# Patient Record
Sex: FEMALE | Race: WHITE | Employment: OTHER | URBAN - METROPOLITAN AREA
[De-identification: names, ages, dates, MRNs, and addresses within clinical notes are randomized per-mention and may not be internally consistent; named-entity substitution may affect disease eponyms.]

---

## 2017-02-13 ENCOUNTER — OFFICE VISIT (OUTPATIENT)
Dept: PHYSICAL THERAPY | Age: 37
End: 2017-02-13
Attending: SURGERY
Payer: COMMERCIAL

## 2017-02-13 DIAGNOSIS — C43.71 MALIGNANT MELANOMA OF RIGHT KNEE (HCC): Primary | ICD-10-CM

## 2017-02-13 PROCEDURE — 97535 SELF CARE MNGMENT TRAINING: CPT

## 2017-02-13 PROCEDURE — 97161 PT EVAL LOW COMPLEX 20 MIN: CPT

## 2017-02-13 NOTE — PROGRESS NOTES
PHYSICAL THERAPY LE LYMPHEDEMA EVALUATION:   Referring Physician: Dr. Elisa Palomo  Diagnosis: Malignant melanoma of right knee [facility]) (C43.71)  Date of Onset: 11/30/16 Date of Service: 2/13/2017     PATIENT SUMMARY   Nancy Murrell is a 29year old y/o female wh is not needed as pt only has 1.4 % limb difference with no evident assymmetry and most of of girth  Measurement with no higher than 1 cm difference.  Pt also has symmetrical and WFL strength and ROM on BLE major joints and mm at 0 extension and 145 flexion Timed Treatment: 20 min     Total Treatment Time: 75 min         PLAN OF CARE:    Goals:  After 1-3 sessions    1. Pt will demonstrate good comprehension /return demonstration of self manual drainage techniques to manage well at home     2.  Pt will be indep

## 2017-02-21 ENCOUNTER — APPOINTMENT (OUTPATIENT)
Dept: PHYSICAL THERAPY | Age: 37
End: 2017-02-21
Attending: SURGERY
Payer: COMMERCIAL

## 2017-02-22 ENCOUNTER — APPOINTMENT (OUTPATIENT)
Dept: PHYSICAL THERAPY | Age: 37
End: 2017-02-22
Attending: SURGERY
Payer: COMMERCIAL

## 2017-02-23 ENCOUNTER — APPOINTMENT (OUTPATIENT)
Dept: PHYSICAL THERAPY | Age: 37
End: 2017-02-23
Attending: SURGERY
Payer: COMMERCIAL

## 2017-02-24 ENCOUNTER — APPOINTMENT (OUTPATIENT)
Dept: PHYSICAL THERAPY | Age: 37
End: 2017-02-24
Attending: SURGERY
Payer: COMMERCIAL

## 2017-02-27 ENCOUNTER — APPOINTMENT (OUTPATIENT)
Dept: PHYSICAL THERAPY | Age: 37
End: 2017-02-27
Attending: SURGERY
Payer: COMMERCIAL

## 2017-02-28 ENCOUNTER — APPOINTMENT (OUTPATIENT)
Dept: PHYSICAL THERAPY | Age: 37
End: 2017-02-28
Attending: SURGERY
Payer: COMMERCIAL

## 2017-03-01 ENCOUNTER — APPOINTMENT (OUTPATIENT)
Dept: PHYSICAL THERAPY | Age: 37
End: 2017-03-01
Attending: SURGERY
Payer: COMMERCIAL

## 2017-03-02 ENCOUNTER — APPOINTMENT (OUTPATIENT)
Dept: PHYSICAL THERAPY | Age: 37
End: 2017-03-02
Attending: SURGERY
Payer: COMMERCIAL

## 2017-03-03 ENCOUNTER — OFFICE VISIT (OUTPATIENT)
Dept: SURGERY | Facility: CLINIC | Age: 37
End: 2017-03-03

## 2017-03-03 VITALS
BODY MASS INDEX: 24.42 KG/M2 | HEART RATE: 118 BPM | SYSTOLIC BLOOD PRESSURE: 126 MMHG | DIASTOLIC BLOOD PRESSURE: 81 MMHG | HEIGHT: 63 IN | WEIGHT: 137.81 LBS

## 2017-03-03 DIAGNOSIS — Z85.820 HISTORY OF MELANOMA: Primary | ICD-10-CM

## 2017-03-03 PROCEDURE — 99213 OFFICE O/P EST LOW 20 MIN: CPT | Performed by: SURGERY

## 2017-03-03 NOTE — PROGRESS NOTES
Estabilshed Patient Consultation    Chief Complaint: h/o melanoma R thigh s/p resection and flap reconstruction    History of Present Illness:   Lexus Verdin is a 29year old female who returns to the office after undergoing excision of melanoma, marilu HPI  All other systems are unremarkable. Physical Exam:    /81 mmHg  Pulse 118  Ht 1.6 m (5' 3\")  Wt 62.506 kg (137 lb 12.8 oz)  BMI 24.42 kg/m2    Constitutional: The patient is an alert, oriented and well-developed.      Neurologic: Speech pat

## 2017-03-06 ENCOUNTER — APPOINTMENT (OUTPATIENT)
Dept: PHYSICAL THERAPY | Age: 37
End: 2017-03-06
Attending: SURGERY
Payer: COMMERCIAL

## 2017-03-07 ENCOUNTER — APPOINTMENT (OUTPATIENT)
Dept: PHYSICAL THERAPY | Age: 37
End: 2017-03-07
Attending: SURGERY
Payer: COMMERCIAL

## 2017-03-08 ENCOUNTER — APPOINTMENT (OUTPATIENT)
Dept: PHYSICAL THERAPY | Age: 37
End: 2017-03-08
Attending: SURGERY
Payer: COMMERCIAL

## 2017-03-09 ENCOUNTER — APPOINTMENT (OUTPATIENT)
Dept: PHYSICAL THERAPY | Age: 37
End: 2017-03-09
Attending: SURGERY
Payer: COMMERCIAL

## 2017-03-13 ENCOUNTER — APPOINTMENT (OUTPATIENT)
Dept: PHYSICAL THERAPY | Age: 37
End: 2017-03-13
Attending: SURGERY
Payer: COMMERCIAL

## 2017-03-14 ENCOUNTER — APPOINTMENT (OUTPATIENT)
Dept: PHYSICAL THERAPY | Age: 37
End: 2017-03-14
Attending: SURGERY
Payer: COMMERCIAL

## 2017-03-15 ENCOUNTER — APPOINTMENT (OUTPATIENT)
Dept: PHYSICAL THERAPY | Age: 37
End: 2017-03-15
Attending: SURGERY
Payer: COMMERCIAL

## 2017-03-16 ENCOUNTER — APPOINTMENT (OUTPATIENT)
Dept: PHYSICAL THERAPY | Age: 37
End: 2017-03-16
Attending: SURGERY
Payer: COMMERCIAL

## 2017-03-23 PROCEDURE — 88305 TISSUE EXAM BY PATHOLOGIST: CPT | Performed by: SURGERY

## 2017-03-23 PROCEDURE — 88342 IMHCHEM/IMCYTCHM 1ST ANTB: CPT | Performed by: SURGERY

## 2017-03-23 PROCEDURE — 88341 IMHCHEM/IMCYTCHM EA ADD ANTB: CPT | Performed by: SURGERY

## 2017-03-23 PROCEDURE — 88173 CYTOPATH EVAL FNA REPORT: CPT | Performed by: SURGERY

## 2017-04-11 PROBLEM — C78.7 LIVER METASTASIS: Status: ACTIVE | Noted: 2017-04-11

## 2017-04-11 PROBLEM — C43.9 METASTATIC MELANOMA (HCC): Status: ACTIVE | Noted: 2017-04-11

## 2017-04-11 PROBLEM — C78.7 LIVER METASTASIS (HCC): Status: ACTIVE | Noted: 2017-04-11

## 2017-04-11 PROBLEM — C79.51 BONE METASTASIS (HCC): Status: ACTIVE | Noted: 2017-04-11

## 2017-04-11 PROBLEM — C79.51 BONE METASTASIS: Status: ACTIVE | Noted: 2017-04-11

## 2017-04-11 PROBLEM — C79.9 METASTATIC MELANOMA (HCC): Status: ACTIVE | Noted: 2017-04-11

## 2017-04-11 PROCEDURE — 86301 IMMUNOASSAY TUMOR CA 19-9: CPT | Performed by: INTERNAL MEDICINE

## 2017-04-11 PROCEDURE — 82378 CARCINOEMBRYONIC ANTIGEN: CPT | Performed by: INTERNAL MEDICINE

## 2017-04-11 PROCEDURE — 86304 IMMUNOASSAY TUMOR CA 125: CPT | Performed by: INTERNAL MEDICINE

## 2017-04-13 ENCOUNTER — LAB ENCOUNTER (OUTPATIENT)
Dept: LAB | Facility: HOSPITAL | Age: 37
End: 2017-04-13
Attending: INTERNAL MEDICINE
Payer: COMMERCIAL

## 2017-04-13 DIAGNOSIS — C79.9 METASTATIC MELANOMA (HCC): Primary | ICD-10-CM

## 2017-04-13 PROCEDURE — 81210 BRAF GENE: CPT

## 2017-04-13 PROCEDURE — 88381 MICRODISSECTION MANUAL: CPT

## 2017-04-20 PROBLEM — Z51.81 THERAPEUTIC DRUG MONITORING: Status: ACTIVE | Noted: 2017-04-20

## 2017-04-27 PROBLEM — M54.6 THORACIC BACK PAIN: Status: ACTIVE | Noted: 2017-04-27

## 2017-04-27 PROBLEM — R10.31 RIGHT INGUINAL PAIN: Status: ACTIVE | Noted: 2017-04-27

## 2017-05-11 PROBLEM — R53.83 FATIGUE: Status: ACTIVE | Noted: 2017-05-11

## 2017-06-09 ENCOUNTER — APPOINTMENT (OUTPATIENT)
Dept: CT IMAGING | Facility: HOSPITAL | Age: 37
End: 2017-06-09
Attending: EMERGENCY MEDICINE
Payer: COMMERCIAL

## 2017-06-09 ENCOUNTER — HOSPITAL ENCOUNTER (EMERGENCY)
Facility: HOSPITAL | Age: 37
Discharge: HOME | End: 2017-06-09
Attending: EMERGENCY MEDICINE
Payer: COMMERCIAL

## 2017-06-09 ENCOUNTER — APPOINTMENT (OUTPATIENT)
Dept: GENERAL RADIOLOGY | Facility: HOSPITAL | Age: 37
End: 2017-06-09
Attending: EMERGENCY MEDICINE
Payer: COMMERCIAL

## 2017-06-09 VITALS
WEIGHT: 140 LBS | BODY MASS INDEX: 24.8 KG/M2 | HEART RATE: 102 BPM | HEIGHT: 63 IN | DIASTOLIC BLOOD PRESSURE: 72 MMHG | RESPIRATION RATE: 16 BRPM | SYSTOLIC BLOOD PRESSURE: 126 MMHG | OXYGEN SATURATION: 100 % | TEMPERATURE: 99 F

## 2017-06-09 DIAGNOSIS — C78.00 METASTATIC MELANOMA TO LUNG, UNSPECIFIED LATERALITY (HCC): Primary | ICD-10-CM

## 2017-06-09 PROCEDURE — 84484 ASSAY OF TROPONIN QUANT: CPT | Performed by: EMERGENCY MEDICINE

## 2017-06-09 PROCEDURE — 85025 COMPLETE CBC W/AUTO DIFF WBC: CPT | Performed by: EMERGENCY MEDICINE

## 2017-06-09 PROCEDURE — 36415 COLL VENOUS BLD VENIPUNCTURE: CPT

## 2017-06-09 PROCEDURE — 71020 XR CHEST PA + LAT CHEST (CPT=71020): CPT | Performed by: EMERGENCY MEDICINE

## 2017-06-09 PROCEDURE — 99285 EMERGENCY DEPT VISIT HI MDM: CPT

## 2017-06-09 PROCEDURE — 80048 BASIC METABOLIC PNL TOTAL CA: CPT | Performed by: EMERGENCY MEDICINE

## 2017-06-09 PROCEDURE — 93010 ELECTROCARDIOGRAM REPORT: CPT | Performed by: EMERGENCY MEDICINE

## 2017-06-09 PROCEDURE — 93005 ELECTROCARDIOGRAM TRACING: CPT

## 2017-06-09 PROCEDURE — 71260 CT THORAX DX C+: CPT | Performed by: EMERGENCY MEDICINE

## 2017-06-09 RX ORDER — HYDROCODONE BITARTRATE AND ACETAMINOPHEN 5; 325 MG/1; MG/1
1-2 TABLET ORAL EVERY 4 HOURS PRN
Qty: 20 TABLET | Refills: 0 | Status: SHIPPED | OUTPATIENT
Start: 2017-06-09 | End: 2017-06-16

## 2017-06-09 NOTE — ED NOTES
Pt here for chest pain. CT ordered to RO PE or disease process. Pt aware and discussed with Dr Pedro Garcia and agreeable to plan.

## 2017-06-09 NOTE — ED PROVIDER NOTES
Patient Seen in: [facility] AND [facility] Emergency Department    History   No chief complaint on file.     Stated Complaint: chest pain    HPI    40year old female with a history of metastatic melanoma to bone and liver complaining of 2 days of diffuse chest sascha socially      Review of Systems    Positive for stated complaint: chest pain  Other systems are as noted in HPI. Constitutional and vital signs reviewed. All other systems reviewed and negative except as noted above.     PSFH elements reviewed from to affect. Her behavior is normal.   Nursing note and vitals reviewed.               ED Course   Nursing notes and Triage vitals reviewed  Labs Reviewed   BASIC METABOLIC PANEL (8) - Abnormal; Notable for the following:     BUN 7 (*)     All other components w or adenopathy. PULMONARY ARTERIES: Normal. No evidence for pulmonary embolus. AORTA: Normal.  No aneurysm or dissection. LUNGS/PLEURA: Too numerous to count bilateral perivascular pulmonary     nodules.  2 largest measurable nodules in each zarina fibroadenomas. 4. Incompletely visualized left adrenal nodules suspicious for metastases. 5. No evidence for pulmonary embolus. 6. Subcentimeter bilateral thyroid nodules.                          XR CHEST PA + LAT CHEST (SBQ=06895)   Final Result history:   able to obtain history from patient  Factors limiting our ability to obtain a history: None    Medical Record Review: I personally reviewed available prior medical records for any recent pertinent discharge summaries, testing, and procedures and Schedule an appointment as soon as possible for a visit        Medications Prescribed:  Current Discharge Medication List    START taking these medications    HYDROcodone-acetaminophen 5-325 MG Oral Tab  Take 1-2 tablets by mouth every 4 (four) hours a

## 2017-07-05 PROBLEM — R50.2 DRUG-INDUCED FEVER: Status: ACTIVE | Noted: 2017-07-05

## 2017-07-11 PROBLEM — D50.9 MICROCYTIC ANEMIA: Status: ACTIVE | Noted: 2017-07-11

## 2017-08-24 PROBLEM — R71.8 MICROCYTOSIS: Status: ACTIVE | Noted: 2017-01-01

## 2017-10-09 PROBLEM — R71.8 MICROCYTOSIS: Status: ACTIVE | Noted: 2017-01-01

## 2017-10-09 PROBLEM — R71.8 MICROCYTOSIS: Status: RESOLVED | Noted: 2017-01-01 | Resolved: 2017-01-01

## 2018-01-01 ENCOUNTER — TELEPHONE (OUTPATIENT)
Dept: RADIATION ONCOLOGY | Facility: HOSPITAL | Age: 38
End: 2018-01-01

## 2018-01-01 ENCOUNTER — HOSPITAL ENCOUNTER (INPATIENT)
Facility: HOSPITAL | Age: 38
LOS: 20 days | Discharge: HOME HEALTH | DRG: 596 | End: 2018-01-01
Attending: EMERGENCY MEDICINE | Admitting: HOSPITALIST
Payer: COMMERCIAL

## 2018-01-01 ENCOUNTER — LAB ENCOUNTER (OUTPATIENT)
Dept: LAB | Facility: HOSPITAL | Age: 38
End: 2018-01-01
Attending: PHYSICIAN ASSISTANT
Payer: COMMERCIAL

## 2018-01-01 ENCOUNTER — OFFICE VISIT (OUTPATIENT)
Dept: HEMATOLOGY/ONCOLOGY | Facility: HOSPITAL | Age: 38
End: 2018-01-01
Attending: INTERNAL MEDICINE
Payer: COMMERCIAL

## 2018-01-01 ENCOUNTER — APPOINTMENT (OUTPATIENT)
Dept: GENERAL RADIOLOGY | Facility: HOSPITAL | Age: 38
DRG: 596 | End: 2018-01-01
Attending: INTERNAL MEDICINE
Payer: COMMERCIAL

## 2018-01-01 ENCOUNTER — APPOINTMENT (OUTPATIENT)
Dept: ULTRASOUND IMAGING | Facility: HOSPITAL | Age: 38
DRG: 596 | End: 2018-01-01
Attending: HOSPITALIST
Payer: COMMERCIAL

## 2018-01-01 ENCOUNTER — OFFICE VISIT (OUTPATIENT)
Dept: RADIATION ONCOLOGY | Facility: HOSPITAL | Age: 38
DRG: 596 | End: 2018-01-01
Attending: RADIOLOGY
Payer: COMMERCIAL

## 2018-01-01 ENCOUNTER — HOSPITAL ENCOUNTER (INPATIENT)
Facility: HOSPITAL | Age: 38
LOS: 4 days | Discharge: HOME HEALTH | DRG: 596 | End: 2018-01-01
Attending: EMERGENCY MEDICINE | Admitting: HOSPITALIST
Payer: COMMERCIAL

## 2018-01-01 ENCOUNTER — APPOINTMENT (OUTPATIENT)
Dept: INTERVENTIONAL RADIOLOGY/VASCULAR | Facility: HOSPITAL | Age: 38
DRG: 596 | End: 2018-01-01
Attending: CLINICAL NURSE SPECIALIST
Payer: COMMERCIAL

## 2018-01-01 ENCOUNTER — APPOINTMENT (OUTPATIENT)
Dept: CT IMAGING | Facility: HOSPITAL | Age: 38
DRG: 596 | End: 2018-01-01
Attending: EMERGENCY MEDICINE
Payer: COMMERCIAL

## 2018-01-01 ENCOUNTER — APPOINTMENT (OUTPATIENT)
Dept: GENERAL RADIOLOGY | Facility: HOSPITAL | Age: 38
DRG: 596 | End: 2018-01-01
Attending: EMERGENCY MEDICINE
Payer: COMMERCIAL

## 2018-01-01 ENCOUNTER — HOSPITAL ENCOUNTER (EMERGENCY)
Facility: HOSPITAL | Age: 38
Discharge: HOME | End: 2018-01-01
Attending: EMERGENCY MEDICINE
Payer: COMMERCIAL

## 2018-01-01 ENCOUNTER — TELEPHONE (OUTPATIENT)
Dept: HEMATOLOGY/ONCOLOGY | Facility: HOSPITAL | Age: 38
End: 2018-01-01

## 2018-01-01 ENCOUNTER — APPOINTMENT (OUTPATIENT)
Dept: RADIATION ONCOLOGY | Facility: HOSPITAL | Age: 38
End: 2018-01-01
Attending: RADIOLOGY
Payer: COMMERCIAL

## 2018-01-01 ENCOUNTER — NURSE ONLY (OUTPATIENT)
Dept: INTEGRATIVE MEDICINE | Facility: HOSPITAL | Age: 38
End: 2018-01-01
Attending: GENERAL ACUTE CARE HOSPITAL

## 2018-01-01 ENCOUNTER — APPOINTMENT (OUTPATIENT)
Dept: ULTRASOUND IMAGING | Facility: HOSPITAL | Age: 38
DRG: 595 | End: 2018-01-01
Attending: EMERGENCY MEDICINE
Payer: COMMERCIAL

## 2018-01-01 ENCOUNTER — APPOINTMENT (OUTPATIENT)
Dept: CV DIAGNOSTICS | Facility: HOSPITAL | Age: 38
DRG: 596 | End: 2018-01-01
Attending: HOSPITALIST
Payer: COMMERCIAL

## 2018-01-01 ENCOUNTER — HOSPITAL ENCOUNTER (OUTPATIENT)
Dept: ULTRASOUND IMAGING | Facility: HOSPITAL | Age: 38
Discharge: HOME | End: 2018-01-01
Attending: INTERNAL MEDICINE
Payer: COMMERCIAL

## 2018-01-01 ENCOUNTER — HOSPITAL ENCOUNTER (INPATIENT)
Facility: HOSPITAL | Age: 38
LOS: 2 days | DRG: 595 | End: 2018-01-01
Attending: EMERGENCY MEDICINE | Admitting: INTERNAL MEDICINE
Payer: COMMERCIAL

## 2018-01-01 ENCOUNTER — HOSPITAL ENCOUNTER (OUTPATIENT)
Dept: ULTRASOUND IMAGING | Facility: HOSPITAL | Age: 38
Discharge: HOME | End: 2018-01-01
Attending: CLINICAL NURSE SPECIALIST
Payer: COMMERCIAL

## 2018-01-01 ENCOUNTER — APPOINTMENT (OUTPATIENT)
Dept: GENERAL RADIOLOGY | Facility: HOSPITAL | Age: 38
DRG: 595 | End: 2018-01-01
Attending: EMERGENCY MEDICINE
Payer: COMMERCIAL

## 2018-01-01 ENCOUNTER — APPOINTMENT (OUTPATIENT)
Dept: ULTRASOUND IMAGING | Facility: HOSPITAL | Age: 38
DRG: 596 | End: 2018-01-01
Attending: INTERNAL MEDICINE
Payer: COMMERCIAL

## 2018-01-01 ENCOUNTER — APPOINTMENT (OUTPATIENT)
Dept: GENERAL RADIOLOGY | Facility: HOSPITAL | Age: 38
DRG: 596 | End: 2018-01-01
Attending: HOSPITALIST
Payer: COMMERCIAL

## 2018-01-01 ENCOUNTER — APPOINTMENT (OUTPATIENT)
Dept: GENERAL RADIOLOGY | Facility: HOSPITAL | Age: 38
End: 2018-01-01
Attending: EMERGENCY MEDICINE
Payer: COMMERCIAL

## 2018-01-01 VITALS
HEART RATE: 116 BPM | WEIGHT: 158.06 LBS | BODY MASS INDEX: 28 KG/M2 | SYSTOLIC BLOOD PRESSURE: 100 MMHG | OXYGEN SATURATION: 93 % | HEIGHT: 63 IN | RESPIRATION RATE: 18 BRPM | DIASTOLIC BLOOD PRESSURE: 64 MMHG | TEMPERATURE: 98 F

## 2018-01-01 VITALS
RESPIRATION RATE: 18 BRPM | WEIGHT: 150 LBS | BODY MASS INDEX: 26.58 KG/M2 | TEMPERATURE: 98 F | HEIGHT: 63 IN | DIASTOLIC BLOOD PRESSURE: 67 MMHG | SYSTOLIC BLOOD PRESSURE: 97 MMHG | OXYGEN SATURATION: 98 % | HEART RATE: 125 BPM

## 2018-01-01 VITALS
TEMPERATURE: 102 F | HEIGHT: 63 IN | RESPIRATION RATE: 20 BRPM | HEART RATE: 143 BPM | OXYGEN SATURATION: 99 % | SYSTOLIC BLOOD PRESSURE: 73 MMHG | BODY MASS INDEX: 24.52 KG/M2 | WEIGHT: 138.38 LBS | DIASTOLIC BLOOD PRESSURE: 40 MMHG

## 2018-01-01 VITALS
RESPIRATION RATE: 16 BRPM | DIASTOLIC BLOOD PRESSURE: 73 MMHG | OXYGEN SATURATION: 97 % | SYSTOLIC BLOOD PRESSURE: 111 MMHG | HEART RATE: 126 BPM

## 2018-01-01 VITALS
DIASTOLIC BLOOD PRESSURE: 63 MMHG | RESPIRATION RATE: 16 BRPM | SYSTOLIC BLOOD PRESSURE: 102 MMHG | TEMPERATURE: 98 F | HEART RATE: 104 BPM

## 2018-01-01 VITALS
OXYGEN SATURATION: 95 % | DIASTOLIC BLOOD PRESSURE: 78 MMHG | TEMPERATURE: 98 F | RESPIRATION RATE: 20 BRPM | HEART RATE: 112 BPM | BODY MASS INDEX: 24 KG/M2 | SYSTOLIC BLOOD PRESSURE: 102 MMHG | WEIGHT: 135.19 LBS

## 2018-01-01 VITALS
SYSTOLIC BLOOD PRESSURE: 92 MMHG | HEART RATE: 100 BPM | TEMPERATURE: 98 F | RESPIRATION RATE: 16 BRPM | DIASTOLIC BLOOD PRESSURE: 55 MMHG

## 2018-01-01 VITALS
DIASTOLIC BLOOD PRESSURE: 78 MMHG | RESPIRATION RATE: 16 BRPM | TEMPERATURE: 98 F | HEART RATE: 105 BPM | SYSTOLIC BLOOD PRESSURE: 119 MMHG

## 2018-01-01 DIAGNOSIS — C79.9 METASTATIC MELANOMA (HCC): ICD-10-CM

## 2018-01-01 DIAGNOSIS — C79.31 BRAIN METASTASES (HCC): Primary | ICD-10-CM

## 2018-01-01 DIAGNOSIS — E87.1 HYPONATREMIA: ICD-10-CM

## 2018-01-01 DIAGNOSIS — C79.9 METASTATIC MELANOMA (HCC): Primary | ICD-10-CM

## 2018-01-01 DIAGNOSIS — E72.20 HYPERAMMONEMIA (HCC): Primary | ICD-10-CM

## 2018-01-01 DIAGNOSIS — R18.0 MALIGNANT ASCITES: ICD-10-CM

## 2018-01-01 DIAGNOSIS — E87.5 HYPERKALEMIA: ICD-10-CM

## 2018-01-01 DIAGNOSIS — D50.9 MICROCYTIC ANEMIA: ICD-10-CM

## 2018-01-01 DIAGNOSIS — R50.2 DRUG-INDUCED FEVER: Primary | ICD-10-CM

## 2018-01-01 DIAGNOSIS — C78.7 LIVER METASTASIS (HCC): ICD-10-CM

## 2018-01-01 DIAGNOSIS — R00.0 TACHYCARDIA: ICD-10-CM

## 2018-01-01 DIAGNOSIS — R06.00 DYSPNEA, UNSPECIFIED TYPE: ICD-10-CM

## 2018-01-01 DIAGNOSIS — R74.01 TRANSAMINITIS: ICD-10-CM

## 2018-01-01 DIAGNOSIS — C43.71 MALIGNANT MELANOMA OF RIGHT KNEE (HCC): ICD-10-CM

## 2018-01-01 DIAGNOSIS — E72.20 HYPERAMMONEMIA (HCC): ICD-10-CM

## 2018-01-01 DIAGNOSIS — R18.8 OTHER ASCITES: ICD-10-CM

## 2018-01-01 DIAGNOSIS — R18.0 ASCITES, MALIGNANT: ICD-10-CM

## 2018-01-01 DIAGNOSIS — D64.89 ANEMIA DUE TO OTHER CAUSE, NOT CLASSIFIED: ICD-10-CM

## 2018-01-01 DIAGNOSIS — C79.9 METASTATIC CANCER (HCC): ICD-10-CM

## 2018-01-01 DIAGNOSIS — C79.51 BONE METASTASIS (HCC): ICD-10-CM

## 2018-01-01 DIAGNOSIS — R53.1 WEAKNESS: ICD-10-CM

## 2018-01-01 DIAGNOSIS — M79.89 SWELLING OF BOTH LOWER EXTREMITIES: ICD-10-CM

## 2018-01-01 DIAGNOSIS — D50.9 IRON DEFICIENCY ANEMIA, UNSPECIFIED: Primary | ICD-10-CM

## 2018-01-01 LAB
ALBUMIN SERPL BCP-MCNC: 1.2 G/DL (ref 3.5–4.8)
ALBUMIN SERPL BCP-MCNC: 1.4 G/DL (ref 3.5–4.8)
ALBUMIN SERPL BCP-MCNC: 2.5 G/DL (ref 3.5–4.8)
ALBUMIN/GLOB SERPL: 0.5 {RATIO} (ref 1–2)
ALP SERPL-CCNC: 145 U/L (ref 32–100)
ALP SERPL-CCNC: 1985 U/L (ref 32–100)
ALP SERPL-CCNC: 2655 U/L (ref 32–100)
ALT SERPL-CCNC: 103 U/L (ref 14–54)
ALT SERPL-CCNC: 24 U/L (ref 14–54)
ALT SERPL-CCNC: 96 U/L (ref 14–54)
AMMONIA PLAS-MCNC: 171 UG/DL (ref 19.5–64.6)
AMMONIA PLAS-MCNC: 215 UG/DL (ref 19.5–64.6)
ANION GAP SERPL CALC-SCNC: 10 MMOL/L (ref 0–18)
ANION GAP SERPL CALC-SCNC: 11 MMOL/L (ref 0–18)
ANION GAP SERPL CALC-SCNC: 7 MMOL/L (ref 0–18)
ANTIBODY SCREEN: NEGATIVE
ANTIBODY SCREEN: NEGATIVE
AST SERPL-CCNC: 108 U/L (ref 15–41)
AST SERPL-CCNC: 114 U/L (ref 15–41)
AST SERPL-CCNC: 29 U/L (ref 15–41)
BASOPHILS # BLD: 0 K/UL (ref 0–0.2)
BASOPHILS # BLD: 0.1 K/UL (ref 0–0.2)
BASOPHILS # BLD: 0.1 K/UL (ref 0–0.2)
BASOPHILS NFR BLD: 0 %
BASOPHILS NFR BLD: 1 %
BASOPHILS NFR BLD: 1 %
BILIRUB DIRECT SERPL-MCNC: 0.1 MG/DL (ref 0–0.2)
BILIRUB DIRECT SERPL-MCNC: 0.8 MG/DL (ref 0–0.2)
BILIRUB SERPL-MCNC: 0.6 MG/DL (ref 0.3–1.2)
BILIRUB SERPL-MCNC: 1.5 MG/DL (ref 0.3–1.2)
BILIRUB SERPL-MCNC: 1.6 MG/DL (ref 0.3–1.2)
BILIRUB UR QL: NEGATIVE
BILIRUB UR QL: NEGATIVE
BLOOD TYPE BARCODE: 5100
BUN SERPL-MCNC: 11 MG/DL (ref 8–20)
BUN SERPL-MCNC: 11 MG/DL (ref 8–20)
BUN SERPL-MCNC: 12 MG/DL (ref 8–20)
BUN/CREAT SERPL: 15.5 (ref 10–20)
BUN/CREAT SERPL: 19.3 (ref 10–20)
BUN/CREAT SERPL: 30.8 (ref 10–20)
CALCIUM SERPL-MCNC: 5.9 MG/DL (ref 8.5–10.5)
CALCIUM SERPL-MCNC: 6.2 MG/DL (ref 8.5–10.5)
CALCIUM SERPL-MCNC: 8.5 MG/DL (ref 8.5–10.5)
CHLORIDE SERPL-SCNC: 103 MMOL/L (ref 95–110)
CHLORIDE SERPL-SCNC: 99 MMOL/L (ref 95–110)
CHLORIDE SERPL-SCNC: 99 MMOL/L (ref 95–110)
CLARITY UR: CLEAR
CO2 SERPL-SCNC: 24 MMOL/L (ref 22–32)
CO2 SERPL-SCNC: 25 MMOL/L (ref 22–32)
CO2 SERPL-SCNC: 25 MMOL/L (ref 22–32)
COLOR UR: YELLOW
CORTIS SERPL-MCNC: 18.3 MCG/DL
CREAT SERPL-MCNC: 0.39 MG/DL (ref 0.5–1.5)
CREAT SERPL-MCNC: 0.57 MG/DL (ref 0.5–1.5)
CREAT SERPL-MCNC: 0.71 MG/DL (ref 0.5–1.5)
EOSINOPHIL # BLD: 0 K/UL (ref 0–0.7)
EOSINOPHIL # BLD: 0.1 K/UL (ref 0–0.7)
EOSINOPHIL # BLD: 0.1 K/UL (ref 0–0.7)
EOSINOPHIL NFR BLD: 0 %
EOSINOPHIL NFR BLD: 1 %
EOSINOPHIL NFR BLD: 1 %
ERYTHROCYTE [DISTWIDTH] IN BLOOD BY AUTOMATED COUNT: 19.5 % (ref 11–15)
ERYTHROCYTE [DISTWIDTH] IN BLOOD BY AUTOMATED COUNT: 23.9 % (ref 11–15)
ERYTHROCYTE [DISTWIDTH] IN BLOOD BY AUTOMATED COUNT: 24.6 % (ref 11–15)
FERRITIN SERPL IA-MCNC: 1985 NG/ML (ref 11–307)
GLOBULIN PLAS-MCNC: 2.6 G/DL (ref 2.5–3.7)
GLUCOSE BLDC GLUCOMTR-MCNC: 109 MG/DL (ref 70–99)
GLUCOSE BLDC GLUCOMTR-MCNC: 116 MG/DL (ref 70–99)
GLUCOSE BLDC GLUCOMTR-MCNC: 131 MG/DL (ref 70–99)
GLUCOSE BLDC GLUCOMTR-MCNC: 140 MG/DL (ref 70–99)
GLUCOSE BLDC GLUCOMTR-MCNC: 195 MG/DL (ref 70–99)
GLUCOSE SERPL-MCNC: 105 MG/DL (ref 70–99)
GLUCOSE SERPL-MCNC: 149 MG/DL (ref 70–99)
GLUCOSE SERPL-MCNC: 166 MG/DL (ref 70–99)
GLUCOSE UR-MCNC: NEGATIVE MG/DL
GLUCOSE UR-MCNC: NEGATIVE MG/DL
HBA1C MFR BLD: 7.7 % (ref 4–6)
HCT VFR BLD AUTO: 25.4 % (ref 35–48)
HCT VFR BLD AUTO: 35.5 % (ref 35–48)
HCT VFR BLD AUTO: 39.2 % (ref 35–48)
HGB BLD-MCNC: 10.9 G/DL (ref 12–16)
HGB BLD-MCNC: 11.6 G/DL (ref 12–16)
HGB BLD-MCNC: 8 G/DL (ref 12–16)
HGB UR QL STRIP.AUTO: NEGATIVE
HYALINE CASTS #/AREA URNS AUTO: 1 /LPF
INR BLD: 1.5 (ref 0.9–1.2)
IRON SATN MFR SERPL: 10 % (ref 15–50)
IRON SERPL-MCNC: 15 MCG/DL (ref 28–170)
KETONES UR-MCNC: NEGATIVE MG/DL
LACTATE SERPL-SCNC: 1.9 MMOL/L (ref 0.5–2.2)
LACTATE SERPL-SCNC: 3.1 MMOL/L (ref 0.5–2.2)
LACTATE SERPL-SCNC: 3.3 MMOL/L (ref 0.5–2.2)
LACTATE SERPL-SCNC: 3.4 MMOL/L (ref 0.5–2.2)
LACTATE SERPL-SCNC: 3.5 MMOL/L (ref 0.5–2.2)
LYMPHOCYTES # BLD: 0.5 K/UL (ref 1–4)
LYMPHOCYTES # BLD: 1.5 K/UL (ref 1–4)
LYMPHOCYTES # BLD: 2 K/UL (ref 1–4)
LYMPHOCYTES NFR BLD: 11 %
LYMPHOCYTES NFR BLD: 15 %
LYMPHOCYTES NFR BLD: 3 %
MCH RBC QN AUTO: 23.4 PG (ref 27–32)
MCH RBC QN AUTO: 24 PG (ref 27–32)
MCH RBC QN AUTO: 24.1 PG (ref 27–32)
MCHC RBC AUTO-ENTMCNC: 29.6 G/DL (ref 32–37)
MCHC RBC AUTO-ENTMCNC: 30.8 G/DL (ref 32–37)
MCHC RBC AUTO-ENTMCNC: 31.3 G/DL (ref 32–37)
MCV RBC AUTO: 76.5 FL (ref 80–100)
MCV RBC AUTO: 78.2 FL (ref 80–100)
MCV RBC AUTO: 79.1 FL (ref 80–100)
MONOCYTES # BLD: 0.4 K/UL (ref 0–1)
MONOCYTES # BLD: 0.4 K/UL (ref 0–1)
MONOCYTES # BLD: 0.7 K/UL (ref 0–1)
MONOCYTES NFR BLD: 3 %
MONOCYTES NFR BLD: 3 %
MONOCYTES NFR BLD: 5 %
NEUTROPHILS # BLD AUTO: 11.1 K/UL (ref 1.8–7.7)
NEUTROPHILS # BLD AUTO: 12 K/UL (ref 1.8–7.7)
NEUTROPHILS # BLD AUTO: 14 K/UL (ref 1.8–7.7)
NEUTROPHILS NFR BLD: 81 %
NEUTROPHILS NFR BLD: 86 %
NEUTROPHILS NFR BLD: 91 %
NITRITE UR QL STRIP.AUTO: NEGATIVE
NITRITE UR QL STRIP.AUTO: NEGATIVE
OSMOLALITY UR CALC.SUM OF ELEC: 278 MOSM/KG (ref 275–295)
OSMOLALITY UR CALC.SUM OF ELEC: 280 MOSM/KG (ref 275–295)
OSMOLALITY UR CALC.SUM OF ELEC: 283 MOSM/KG (ref 275–295)
PH UR: 5 [PH] (ref 5–8)
PH UR: 5 [PH] (ref 5–8)
PLATELET # BLD AUTO: 149 K/UL (ref 140–400)
PLATELET # BLD AUTO: 172 K/UL (ref 140–400)
PLATELET # BLD AUTO: 593 K/UL (ref 140–400)
PMV BLD AUTO: 6.8 FL (ref 7.4–10.3)
PMV BLD AUTO: 8.5 FL (ref 7.4–10.3)
PMV BLD AUTO: 8.5 FL (ref 7.4–10.3)
POTASSIUM SERPL-SCNC: 3.8 MMOL/L (ref 3.3–5.1)
POTASSIUM SERPL-SCNC: 4.2 MMOL/L (ref 3.3–5.1)
POTASSIUM SERPL-SCNC: 4.2 MMOL/L (ref 3.3–5.1)
PROCALCITONIN SERPL-MCNC: 1.1 NG/ML (ref ?–0.11)
PROT SERPL-MCNC: 3.8 G/DL (ref 5.9–8.4)
PROT SERPL-MCNC: 4.3 G/DL (ref 5.9–8.4)
PROT SERPL-MCNC: 6.6 G/DL (ref 5.9–8.4)
PROT UR-MCNC: 30 MG/DL
PROT UR-MCNC: NEGATIVE MG/DL
PROTHROMBIN TIME: 17.3 SECONDS (ref 11.8–14.5)
RBC # BLD AUTO: 3.32 M/UL (ref 3.7–5.4)
RBC # BLD AUTO: 4.53 M/UL (ref 3.7–5.4)
RBC # BLD AUTO: 4.96 M/UL (ref 3.7–5.4)
RBC #/AREA URNS AUTO: 2 /HPF
RBC #/AREA URNS AUTO: 44 /HPF
RH BLOOD TYPE: POSITIVE
RH BLOOD TYPE: POSITIVE
SODIUM SERPL-SCNC: 134 MMOL/L (ref 136–144)
SODIUM SERPL-SCNC: 134 MMOL/L (ref 136–144)
SODIUM SERPL-SCNC: 135 MMOL/L (ref 136–144)
SP GR UR STRIP: 1.02 (ref 1–1.03)
SP GR UR STRIP: 1.02 (ref 1–1.03)
TIBC SERPL-MCNC: 148 MCG/DL (ref 228–428)
TRANSFERRIN SERPL-MCNC: 112 MG/DL (ref 192–382)
UROBILINOGEN UR STRIP-ACNC: 4
UROBILINOGEN UR STRIP-ACNC: <2
VIT C UR-MCNC: 40 MG/DL
VIT C UR-MCNC: NEGATIVE MG/DL
WBC # BLD AUTO: 13.7 K/UL (ref 4–11)
WBC # BLD AUTO: 14 K/UL (ref 4–11)
WBC # BLD AUTO: 15.3 K/UL (ref 4–11)
WBC #/AREA URNS AUTO: 17 /HPF
WBC #/AREA URNS AUTO: 27 /HPF

## 2018-01-01 PROCEDURE — 02HV33Z INSERTION OF INFUSION DEVICE INTO SUPERIOR VENA CAVA, PERCUTANEOUS APPROACH: ICD-10-PCS | Performed by: HOSPITALIST

## 2018-01-01 PROCEDURE — 86900 BLOOD TYPING SEROLOGIC ABO: CPT

## 2018-01-01 PROCEDURE — A4216 STERILE WATER/SALINE, 10 ML: HCPCS

## 2018-01-01 PROCEDURE — 99233 SBSQ HOSP IP/OBS HIGH 50: CPT | Performed by: INTERNAL MEDICINE

## 2018-01-01 PROCEDURE — 99232 SBSQ HOSP IP/OBS MODERATE 35: CPT | Performed by: INTERNAL MEDICINE

## 2018-01-01 PROCEDURE — 86900 BLOOD TYPING SEROLOGIC ABO: CPT | Performed by: EMERGENCY MEDICINE

## 2018-01-01 PROCEDURE — 97166 OT EVAL MOD COMPLEX 45 MIN: CPT

## 2018-01-01 PROCEDURE — 86850 RBC ANTIBODY SCREEN: CPT | Performed by: EMERGENCY MEDICINE

## 2018-01-01 PROCEDURE — 85025 COMPLETE CBC W/AUTO DIFF WBC: CPT | Performed by: HOSPITALIST

## 2018-01-01 PROCEDURE — 83605 ASSAY OF LACTIC ACID: CPT | Performed by: EMERGENCY MEDICINE

## 2018-01-01 PROCEDURE — 76705 ECHO EXAM OF ABDOMEN: CPT | Performed by: CLINICAL NURSE SPECIALIST

## 2018-01-01 PROCEDURE — 87086 URINE CULTURE/COLONY COUNT: CPT | Performed by: PHYSICIAN ASSISTANT

## 2018-01-01 PROCEDURE — 88112 CYTOPATH CELL ENHANCE TECH: CPT

## 2018-01-01 PROCEDURE — 99252 IP/OBS CONSLTJ NEW/EST SF 35: CPT | Performed by: INTERNAL MEDICINE

## 2018-01-01 PROCEDURE — 87430 STREP A AG IA: CPT

## 2018-01-01 PROCEDURE — 36415 COLL VENOUS BLD VENIPUNCTURE: CPT | Performed by: CLINICAL NURSE SPECIALIST

## 2018-01-01 PROCEDURE — 87086 URINE CULTURE/COLONY COUNT: CPT | Performed by: EMERGENCY MEDICINE

## 2018-01-01 PROCEDURE — 85025 COMPLETE CBC W/AUTO DIFF WBC: CPT | Performed by: EMERGENCY MEDICINE

## 2018-01-01 PROCEDURE — 96360 HYDRATION IV INFUSION INIT: CPT

## 2018-01-01 PROCEDURE — 85027 COMPLETE CBC AUTOMATED: CPT | Performed by: CLINICAL NURSE SPECIALIST

## 2018-01-01 PROCEDURE — 85610 PROTHROMBIN TIME: CPT | Performed by: CLINICAL NURSE SPECIALIST

## 2018-01-01 PROCEDURE — 80048 BASIC METABOLIC PNL TOTAL CA: CPT | Performed by: HOSPITALIST

## 2018-01-01 PROCEDURE — 82024 ASSAY OF ACTH: CPT | Performed by: INTERNAL MEDICINE

## 2018-01-01 PROCEDURE — 83690 ASSAY OF LIPASE: CPT | Performed by: EMERGENCY MEDICINE

## 2018-01-01 PROCEDURE — 86901 BLOOD TYPING SEROLOGIC RH(D): CPT

## 2018-01-01 PROCEDURE — 36415 COLL VENOUS BLD VENIPUNCTURE: CPT

## 2018-01-01 PROCEDURE — 99231 SBSQ HOSP IP/OBS SF/LOW 25: CPT | Performed by: INTERNAL MEDICINE

## 2018-01-01 PROCEDURE — 84132 ASSAY OF SERUM POTASSIUM: CPT | Performed by: HOSPITALIST

## 2018-01-01 PROCEDURE — 88112 CYTOPATH CELL ENHANCE TECH: CPT | Performed by: INTERNAL MEDICINE

## 2018-01-01 PROCEDURE — 80053 COMPREHEN METABOLIC PANEL: CPT | Performed by: HOSPITALIST

## 2018-01-01 PROCEDURE — 93005 ELECTROCARDIOGRAM TRACING: CPT

## 2018-01-01 PROCEDURE — 85007 BL SMEAR W/DIFF WBC COUNT: CPT | Performed by: HOSPITALIST

## 2018-01-01 PROCEDURE — 93010 ELECTROCARDIOGRAM REPORT: CPT | Performed by: EMERGENCY MEDICINE

## 2018-01-01 PROCEDURE — 49083 ABD PARACENTESIS W/IMAGING: CPT | Performed by: HOSPITALIST

## 2018-01-01 PROCEDURE — 85610 PROTHROMBIN TIME: CPT

## 2018-01-01 PROCEDURE — 71046 X-RAY EXAM CHEST 2 VIEWS: CPT | Performed by: EMERGENCY MEDICINE

## 2018-01-01 PROCEDURE — 99152 MOD SED SAME PHYS/QHP 5/>YRS: CPT

## 2018-01-01 PROCEDURE — 36430 TRANSFUSION BLD/BLD COMPNT: CPT

## 2018-01-01 PROCEDURE — 85379 FIBRIN DEGRADATION QUANT: CPT | Performed by: EMERGENCY MEDICINE

## 2018-01-01 PROCEDURE — 87186 SC STD MICRODIL/AGAR DIL: CPT | Performed by: EMERGENCY MEDICINE

## 2018-01-01 PROCEDURE — 86920 COMPATIBILITY TEST SPIN: CPT

## 2018-01-01 PROCEDURE — 71045 X-RAY EXAM CHEST 1 VIEW: CPT | Performed by: INTERNAL MEDICINE

## 2018-01-01 PROCEDURE — 93970 EXTREMITY STUDY: CPT | Performed by: HOSPITALIST

## 2018-01-01 PROCEDURE — 70450 CT HEAD/BRAIN W/O DYE: CPT | Performed by: EMERGENCY MEDICINE

## 2018-01-01 PROCEDURE — 86850 RBC ANTIBODY SCREEN: CPT

## 2018-01-01 PROCEDURE — 93971 EXTREMITY STUDY: CPT | Performed by: EMERGENCY MEDICINE

## 2018-01-01 PROCEDURE — 80048 BASIC METABOLIC PNL TOTAL CA: CPT | Performed by: EMERGENCY MEDICINE

## 2018-01-01 PROCEDURE — 93306 TTE W/DOPPLER COMPLETE: CPT | Performed by: HOSPITALIST

## 2018-01-01 PROCEDURE — 87040 BLOOD CULTURE FOR BACTERIA: CPT | Performed by: EMERGENCY MEDICINE

## 2018-01-01 PROCEDURE — 81001 URINALYSIS AUTO W/SCOPE: CPT | Performed by: EMERGENCY MEDICINE

## 2018-01-01 PROCEDURE — 49083 ABD PARACENTESIS W/IMAGING: CPT | Performed by: INTERNAL MEDICINE

## 2018-01-01 PROCEDURE — 88342 IMHCHEM/IMCYTCHM 1ST ANTB: CPT | Performed by: INTERNAL MEDICINE

## 2018-01-01 PROCEDURE — 85027 COMPLETE CBC AUTOMATED: CPT | Performed by: HOSPITALIST

## 2018-01-01 PROCEDURE — 97116 GAIT TRAINING THERAPY: CPT

## 2018-01-01 PROCEDURE — 87205 SMEAR GRAM STAIN: CPT | Performed by: HOSPITALIST

## 2018-01-01 PROCEDURE — 30233R1 TRANSFUSION OF NONAUTOLOGOUS PLATELETS INTO PERIPHERAL VEIN, PERCUTANEOUS APPROACH: ICD-10-PCS | Performed by: HOSPITALIST

## 2018-01-01 PROCEDURE — 87070 CULTURE OTHR SPECIMN AEROBIC: CPT | Performed by: HOSPITALIST

## 2018-01-01 PROCEDURE — 99285 EMERGENCY DEPT VISIT HI MDM: CPT

## 2018-01-01 PROCEDURE — 96374 THER/PROPH/DIAG INJ IV PUSH: CPT

## 2018-01-01 PROCEDURE — 89051 BODY FLUID CELL COUNT: CPT | Performed by: HOSPITALIST

## 2018-01-01 PROCEDURE — 76770 US EXAM ABDO BACK WALL COMP: CPT | Performed by: HOSPITALIST

## 2018-01-01 PROCEDURE — 88341 IMHCHEM/IMCYTCHM EA ADD ANTB: CPT | Performed by: INTERNAL MEDICINE

## 2018-01-01 PROCEDURE — 80053 COMPREHEN METABOLIC PANEL: CPT | Performed by: EMERGENCY MEDICINE

## 2018-01-01 PROCEDURE — 88160 CYTOPATH SMEAR OTHER SOURCE: CPT | Performed by: HOSPITALIST

## 2018-01-01 PROCEDURE — 0W9G30Z DRAINAGE OF PERITONEAL CAVITY WITH DRAINAGE DEVICE, PERCUTANEOUS APPROACH: ICD-10-PCS | Performed by: RADIOLOGY

## 2018-01-01 PROCEDURE — 74018 RADEX ABDOMEN 1 VIEW: CPT | Performed by: HOSPITALIST

## 2018-01-01 PROCEDURE — 96375 TX/PRO/DX INJ NEW DRUG ADDON: CPT

## 2018-01-01 PROCEDURE — 80076 HEPATIC FUNCTION PANEL: CPT | Performed by: EMERGENCY MEDICINE

## 2018-01-01 PROCEDURE — 85060 BLOOD SMEAR INTERPRETATION: CPT | Performed by: EMERGENCY MEDICINE

## 2018-01-01 PROCEDURE — 86901 BLOOD TYPING SEROLOGIC RH(D): CPT | Performed by: EMERGENCY MEDICINE

## 2018-01-01 PROCEDURE — 87088 URINE BACTERIA CULTURE: CPT | Performed by: EMERGENCY MEDICINE

## 2018-01-01 PROCEDURE — 71045 X-RAY EXAM CHEST 1 VIEW: CPT | Performed by: EMERGENCY MEDICINE

## 2018-01-01 PROCEDURE — 97162 PT EVAL MOD COMPLEX 30 MIN: CPT

## 2018-01-01 PROCEDURE — 99153 MOD SED SAME PHYS/QHP EA: CPT

## 2018-01-01 PROCEDURE — 10030 IMG GID FLU COLL DRG SFT TIS: CPT

## 2018-01-01 PROCEDURE — 96361 HYDRATE IV INFUSION ADD-ON: CPT

## 2018-01-01 PROCEDURE — P9047 ALBUMIN (HUMAN), 25%, 50ML: HCPCS | Performed by: CLINICAL NURSE SPECIALIST

## 2018-01-01 PROCEDURE — 76705 ECHO EXAM OF ABDOMEN: CPT | Performed by: INTERNAL MEDICINE

## 2018-01-01 PROCEDURE — 71260 CT THORAX DX C+: CPT | Performed by: EMERGENCY MEDICINE

## 2018-01-01 PROCEDURE — 85730 THROMBOPLASTIN TIME PARTIAL: CPT

## 2018-01-01 PROCEDURE — 88305 TISSUE EXAM BY PATHOLOGIST: CPT | Performed by: INTERNAL MEDICINE

## 2018-01-01 PROCEDURE — 84157 ASSAY OF PROTEIN OTHER: CPT | Performed by: HOSPITALIST

## 2018-01-01 PROCEDURE — 89050 BODY FLUID CELL COUNT: CPT | Performed by: HOSPITALIST

## 2018-01-01 PROCEDURE — 0W9G3ZZ DRAINAGE OF PERITONEAL CAVITY, PERCUTANEOUS APPROACH: ICD-10-PCS | Performed by: CLINICAL NURSE SPECIALIST

## 2018-01-01 RX ORDER — CALCIUM CARBONATE 200(500)MG
500 TABLET,CHEWABLE ORAL 3 TIMES DAILY
Qty: 90 TABLET | Refills: 1 | Status: SHIPPED | OUTPATIENT
Start: 2018-01-01

## 2018-01-01 RX ORDER — ACETAMINOPHEN 325 MG/1
650 TABLET ORAL ONCE
Status: CANCELLED | OUTPATIENT
Start: 2018-01-01

## 2018-01-01 RX ORDER — DIPHENHYDRAMINE HCL 25 MG
25 CAPSULE ORAL ONCE
Status: CANCELLED | OUTPATIENT
Start: 2018-01-01

## 2018-01-01 RX ORDER — SCOLOPAMINE TRANSDERMAL SYSTEM 1 MG/1
1 PATCH, EXTENDED RELEASE TRANSDERMAL
Status: DISCONTINUED | OUTPATIENT
Start: 2018-01-01 | End: 2018-01-01

## 2018-01-01 RX ORDER — DIPHENHYDRAMINE HCL 25 MG
25 CAPSULE ORAL ONCE
Status: COMPLETED | OUTPATIENT
Start: 2018-01-01 | End: 2018-01-01

## 2018-01-01 RX ORDER — METOPROLOL SUCCINATE 100 MG/1
100 TABLET, EXTENDED RELEASE ORAL
Status: DISCONTINUED | OUTPATIENT
Start: 2018-01-01 | End: 2018-01-01

## 2018-01-01 RX ORDER — METOCLOPRAMIDE HYDROCHLORIDE 5 MG/ML
10 INJECTION INTRAMUSCULAR; INTRAVENOUS EVERY 6 HOURS PRN
Status: DISCONTINUED | OUTPATIENT
Start: 2018-01-01 | End: 2018-01-01

## 2018-01-01 RX ORDER — ACETAMINOPHEN 325 MG/1
650 TABLET ORAL EVERY 6 HOURS PRN
Status: DISCONTINUED | OUTPATIENT
Start: 2018-01-01 | End: 2018-01-01

## 2018-01-01 RX ORDER — 0.9 % SODIUM CHLORIDE 0.9 %
VIAL (ML) INJECTION
Status: DISCONTINUED
Start: 2018-01-01 | End: 2018-01-01

## 2018-01-01 RX ORDER — 0.9 % SODIUM CHLORIDE 0.9 %
VIAL (ML) INJECTION
Status: COMPLETED
Start: 2018-01-01 | End: 2018-01-01

## 2018-01-01 RX ORDER — ONDANSETRON 4 MG/1
4 TABLET, FILM COATED ORAL EVERY 8 HOURS PRN
Qty: 40 TABLET | Refills: 0 | Status: SHIPPED | OUTPATIENT
Start: 2018-01-01 | End: 2018-01-01

## 2018-01-01 RX ORDER — DIAZEPAM 5 MG/1
5 TABLET ORAL 2 TIMES DAILY
Status: DISCONTINUED | OUTPATIENT
Start: 2018-01-01 | End: 2018-01-01

## 2018-01-01 RX ORDER — MORPHINE SULFATE 15 MG/1
15 TABLET, FILM COATED, EXTENDED RELEASE ORAL EVERY 8 HOURS PRN
Status: DISCONTINUED | OUTPATIENT
Start: 2018-01-01 | End: 2018-01-01

## 2018-01-01 RX ORDER — MIDAZOLAM HYDROCHLORIDE 1 MG/ML
INJECTION INTRAMUSCULAR; INTRAVENOUS
Status: COMPLETED
Start: 2018-01-01 | End: 2018-01-01

## 2018-01-01 RX ORDER — POTASSIUM CHLORIDE 14.9 MG/ML
20 INJECTION INTRAVENOUS ONCE
Status: COMPLETED | OUTPATIENT
Start: 2018-01-01 | End: 2018-01-01

## 2018-01-01 RX ORDER — POTASSIUM CHLORIDE 20 MEQ/1
40 TABLET, EXTENDED RELEASE ORAL ONCE
Status: COMPLETED | OUTPATIENT
Start: 2018-01-01 | End: 2018-01-01

## 2018-01-01 RX ORDER — MORPHINE SULFATE 4 MG/ML
2 INJECTION, SOLUTION INTRAMUSCULAR; INTRAVENOUS EVERY 4 HOURS PRN
Status: DISCONTINUED | OUTPATIENT
Start: 2018-01-01 | End: 2018-01-01

## 2018-01-01 RX ORDER — METOCLOPRAMIDE HYDROCHLORIDE 5 MG/ML
10 INJECTION INTRAMUSCULAR; INTRAVENOUS EVERY 8 HOURS PRN
Status: DISCONTINUED | OUTPATIENT
Start: 2018-01-01 | End: 2018-01-01

## 2018-01-01 RX ORDER — VALGANCICLOVIR 450 MG/1
900 TABLET, FILM COATED ORAL 2 TIMES DAILY
Qty: 16 TABLET | Refills: 0 | Status: SHIPPED | OUTPATIENT
Start: 2018-01-01 | End: 2018-01-01

## 2018-01-01 RX ORDER — MORPHINE SULFATE 4 MG/ML
4 INJECTION, SOLUTION INTRAMUSCULAR; INTRAVENOUS ONCE
Status: COMPLETED | OUTPATIENT
Start: 2018-01-01 | End: 2018-01-01

## 2018-01-01 RX ORDER — SODIUM CHLORIDE 9 MG/ML
INJECTION, SOLUTION INTRAVENOUS
Status: COMPLETED
Start: 2018-01-01 | End: 2018-01-01

## 2018-01-01 RX ORDER — LORAZEPAM 2 MG/ML
0.5 CONCENTRATE ORAL EVERY 8 HOURS PRN
Status: DISCONTINUED | OUTPATIENT
Start: 2018-01-01 | End: 2018-01-01

## 2018-01-01 RX ORDER — DEXTROSE MONOHYDRATE 25 G/50ML
50 INJECTION, SOLUTION INTRAVENOUS ONCE
Status: COMPLETED | OUTPATIENT
Start: 2018-01-01 | End: 2018-01-01

## 2018-01-01 RX ORDER — POLYETHYLENE GLYCOL 3350 17 G/17G
17 POWDER, FOR SOLUTION ORAL DAILY PRN
Status: DISCONTINUED | OUTPATIENT
Start: 2018-01-01 | End: 2018-01-01

## 2018-01-01 RX ORDER — MORPHINE SULFATE 4 MG/ML
2 INJECTION, SOLUTION INTRAMUSCULAR; INTRAVENOUS EVERY 2 HOUR PRN
Status: DISCONTINUED | OUTPATIENT
Start: 2018-01-01 | End: 2018-01-01

## 2018-01-01 RX ORDER — ONDANSETRON 2 MG/ML
4 INJECTION INTRAMUSCULAR; INTRAVENOUS EVERY 6 HOURS PRN
Status: DISCONTINUED | OUTPATIENT
Start: 2018-01-01 | End: 2018-01-01

## 2018-01-01 RX ORDER — LOPERAMIDE HYDROCHLORIDE 2 MG/1
2 CAPSULE ORAL 4 TIMES DAILY PRN
Status: DISCONTINUED | OUTPATIENT
Start: 2018-01-01 | End: 2018-01-01

## 2018-01-01 RX ORDER — CIPROFLOXACIN 500 MG/1
500 TABLET, FILM COATED ORAL ONCE
Status: COMPLETED | OUTPATIENT
Start: 2018-01-01 | End: 2018-01-01

## 2018-01-01 RX ORDER — DILTIAZEM HYDROCHLORIDE 60 MG/1
60 TABLET, FILM COATED ORAL AS NEEDED
Status: DISCONTINUED | OUTPATIENT
Start: 2018-01-01 | End: 2018-01-01

## 2018-01-01 RX ORDER — ALBUMIN (HUMAN) 12.5 G/50ML
25 SOLUTION INTRAVENOUS 2 TIMES DAILY
Status: COMPLETED | OUTPATIENT
Start: 2018-01-01 | End: 2018-01-01

## 2018-01-01 RX ORDER — SODIUM CHLORIDE 9 MG/ML
INJECTION, SOLUTION INTRAVENOUS CONTINUOUS
Status: DISCONTINUED | OUTPATIENT
Start: 2018-01-01 | End: 2018-01-01

## 2018-01-01 RX ORDER — ACETAMINOPHEN 325 MG/1
TABLET ORAL
Status: COMPLETED
Start: 2018-01-01 | End: 2018-01-01

## 2018-01-01 RX ORDER — AMIODARONE HYDROCHLORIDE 200 MG/1
200 TABLET ORAL ONCE
Status: COMPLETED | OUTPATIENT
Start: 2018-01-01 | End: 2018-01-01

## 2018-01-01 RX ORDER — ENOXAPARIN SODIUM 100 MG/ML
40 INJECTION SUBCUTANEOUS DAILY
Status: DISCONTINUED | OUTPATIENT
Start: 2018-01-01 | End: 2018-01-01

## 2018-01-01 RX ORDER — GUAIFENESIN 100 MG/5ML
100 SOLUTION ORAL EVERY 4 HOURS PRN
Status: DISCONTINUED | OUTPATIENT
Start: 2018-01-01 | End: 2018-01-01

## 2018-01-01 RX ORDER — DEXAMETHASONE 4 MG/1
4 TABLET ORAL
Qty: 90 TABLET | Refills: 1 | Status: ON HOLD | OUTPATIENT
Start: 2018-01-01 | End: 2018-01-01

## 2018-01-01 RX ORDER — SODIUM POLYSTYRENE SULFONATE 15 G/60ML
50 SUSPENSION ORAL; RECTAL ONCE
Status: COMPLETED | OUTPATIENT
Start: 2018-01-01 | End: 2018-01-01

## 2018-01-01 RX ORDER — MIRTAZAPINE 15 MG/1
15 TABLET, FILM COATED ORAL NIGHTLY
Status: DISCONTINUED | OUTPATIENT
Start: 2018-01-01 | End: 2018-01-01

## 2018-01-01 RX ORDER — DEXAMETHASONE SODIUM PHOSPHATE 4 MG/ML
10 VIAL (ML) INJECTION ONCE
Status: COMPLETED | OUTPATIENT
Start: 2018-01-01 | End: 2018-01-01

## 2018-01-01 RX ORDER — DILTIAZEM HYDROCHLORIDE 60 MG/1
60 TABLET, FILM COATED ORAL ONCE
Status: DISCONTINUED | OUTPATIENT
Start: 2018-01-01 | End: 2018-01-01

## 2018-01-01 RX ORDER — ONDANSETRON 4 MG/1
8 TABLET, FILM COATED ORAL EVERY 8 HOURS PRN
Status: DISCONTINUED | OUTPATIENT
Start: 2018-01-01 | End: 2018-01-01

## 2018-01-01 RX ORDER — ONDANSETRON 4 MG/1
4 TABLET, FILM COATED ORAL EVERY 8 HOURS PRN
Qty: 20 TABLET | Refills: 0 | Status: CANCELLED
Start: 2018-01-01

## 2018-01-01 RX ORDER — DIAZEPAM 5 MG/1
5 TABLET ORAL EVERY 6 HOURS PRN
Status: DISCONTINUED | OUTPATIENT
Start: 2018-01-01 | End: 2018-01-01

## 2018-01-01 RX ORDER — DIGOXIN 250 MCG
250 TABLET ORAL ONCE
Status: COMPLETED | OUTPATIENT
Start: 2018-01-01 | End: 2018-01-01

## 2018-01-01 RX ORDER — MORPHINE SULFATE 4 MG/ML
2 INJECTION, SOLUTION INTRAMUSCULAR; INTRAVENOUS ONCE
Status: COMPLETED | OUTPATIENT
Start: 2018-01-01 | End: 2018-01-01

## 2018-01-01 RX ORDER — SODIUM CHLORIDE 0.9 % (FLUSH) 0.9 %
10 SYRINGE (ML) INJECTION AS NEEDED
Status: DISCONTINUED | OUTPATIENT
Start: 2018-01-01 | End: 2018-01-01

## 2018-01-01 RX ORDER — AMOXICILLIN AND CLAVULANATE POTASSIUM 500; 125 MG/1; MG/1
500 TABLET, FILM COATED ORAL EVERY 24 HOURS
Status: DISCONTINUED | OUTPATIENT
Start: 2018-01-01 | End: 2018-01-01

## 2018-01-01 RX ORDER — ALBUMIN (HUMAN) 12.5 G/50ML
25 SOLUTION INTRAVENOUS ONCE
Status: COMPLETED | OUTPATIENT
Start: 2018-01-01 | End: 2018-01-01

## 2018-01-01 RX ORDER — MORPHINE SULFATE 15 MG/1
15 TABLET, FILM COATED, EXTENDED RELEASE ORAL EVERY 12 HOURS SCHEDULED
Status: DISCONTINUED | OUTPATIENT
Start: 2018-01-01 | End: 2018-01-01

## 2018-01-01 RX ORDER — ACETAMINOPHEN 325 MG/1
TABLET ORAL
Status: DISCONTINUED
Start: 2018-01-01 | End: 2018-01-01

## 2018-01-01 RX ORDER — ACETAMINOPHEN 325 MG/1
650 TABLET ORAL ONCE
Status: COMPLETED | OUTPATIENT
Start: 2018-01-01 | End: 2018-01-01

## 2018-01-01 RX ORDER — SODIUM CHLORIDE 9 MG/ML
INJECTION, SOLUTION INTRAVENOUS
Status: DISCONTINUED
Start: 2018-01-01 | End: 2018-01-01

## 2018-01-01 RX ORDER — CIPROFLOXACIN 500 MG/1
500 TABLET, FILM COATED ORAL 2 TIMES DAILY
Qty: 14 TABLET | Refills: 0 | Status: SHIPPED | OUTPATIENT
Start: 2018-01-01 | End: 2018-01-01

## 2018-01-01 RX ORDER — LACTULOSE 10 G/15ML
20 SOLUTION ORAL ONCE
Status: COMPLETED | OUTPATIENT
Start: 2018-01-01 | End: 2018-01-01

## 2018-01-01 RX ORDER — VALGANCICLOVIR 450 MG/1
900 TABLET, FILM COATED ORAL 2 TIMES DAILY
Status: DISCONTINUED | OUTPATIENT
Start: 2018-01-01 | End: 2018-01-01

## 2018-01-01 RX ORDER — ALLOPURINOL 300 MG/1
300 TABLET ORAL DAILY
Status: DISCONTINUED | OUTPATIENT
Start: 2018-01-01 | End: 2018-01-01

## 2018-01-01 RX ORDER — SODIUM POLYSTYRENE SULFONATE 15 G/60ML
30 SUSPENSION ORAL; RECTAL ONCE
Status: COMPLETED | OUTPATIENT
Start: 2018-01-01 | End: 2018-01-01

## 2018-01-01 RX ORDER — AMIODARONE HYDROCHLORIDE 200 MG/1
200 TABLET ORAL 2 TIMES DAILY WITH MEALS
Qty: 60 TABLET | Refills: 1 | Status: SHIPPED | OUTPATIENT
Start: 2018-01-01

## 2018-01-01 RX ORDER — 0.9 % SODIUM CHLORIDE 0.9 %
VIAL (ML) INJECTION
Status: DISPENSED
Start: 2018-01-01 | End: 2018-01-01

## 2018-01-01 RX ORDER — DIAZEPAM 5 MG/1
5 TABLET ORAL EVERY 12 HOURS
Status: DISCONTINUED | OUTPATIENT
Start: 2018-01-01 | End: 2018-01-01

## 2018-01-01 RX ORDER — FUROSEMIDE 20 MG/1
TABLET ORAL 2 TIMES DAILY
Status: DISCONTINUED | OUTPATIENT
Start: 2018-01-01 | End: 2018-01-01

## 2018-01-01 RX ORDER — ONDANSETRON HYDROCHLORIDE 8 MG/1
8 TABLET, FILM COATED ORAL EVERY 6 HOURS SCHEDULED
Status: DISCONTINUED | OUTPATIENT
Start: 2018-01-01 | End: 2018-01-01

## 2018-01-01 RX ORDER — CALCITRIOL 0.25 UG/1
0.25 CAPSULE, LIQUID FILLED ORAL DAILY
Qty: 30 CAPSULE | Refills: 0 | Status: SHIPPED | OUTPATIENT
Start: 2018-01-01 | End: 2018-01-01

## 2018-01-01 RX ORDER — DILTIAZEM HYDROCHLORIDE 5 MG/ML
5 INJECTION INTRAVENOUS ONCE
Status: COMPLETED | OUTPATIENT
Start: 2018-01-01 | End: 2018-01-01

## 2018-01-01 RX ORDER — ALLOPURINOL 300 MG/1
300 TABLET ORAL DAILY
Qty: 30 TABLET | Refills: 0 | Status: SHIPPED | OUTPATIENT
Start: 2018-01-01 | End: 2018-01-01

## 2018-01-01 RX ORDER — SODIUM POLYSTYRENE SULFONATE 15 G/60ML
30 SUSPENSION ORAL; RECTAL ONCE
Status: DISCONTINUED | OUTPATIENT
Start: 2018-01-01 | End: 2018-01-01

## 2018-01-01 RX ORDER — MAGNESIUM SULFATE HEPTAHYDRATE 40 MG/ML
2 INJECTION, SOLUTION INTRAVENOUS ONCE
Status: COMPLETED | OUTPATIENT
Start: 2018-01-01 | End: 2018-01-01

## 2018-01-01 RX ORDER — CALCITRIOL 0.25 UG/1
0.5 CAPSULE, LIQUID FILLED ORAL DAILY
Status: DISCONTINUED | OUTPATIENT
Start: 2018-01-01 | End: 2018-01-01

## 2018-01-01 RX ORDER — MORPHINE SULFATE 2 MG/ML
1 INJECTION, SOLUTION INTRAMUSCULAR; INTRAVENOUS EVERY 4 HOURS PRN
Status: DISCONTINUED | OUTPATIENT
Start: 2018-01-01 | End: 2018-01-01

## 2018-01-01 RX ORDER — DIPHENHYDRAMINE HCL 25 MG
CAPSULE ORAL
Status: COMPLETED
Start: 2018-01-01 | End: 2018-01-01

## 2018-01-01 RX ORDER — CALCIUM CARBONATE 200(500)MG
500 TABLET,CHEWABLE ORAL 3 TIMES DAILY
Status: DISCONTINUED | OUTPATIENT
Start: 2018-01-01 | End: 2018-01-01

## 2018-01-01 RX ORDER — METOCLOPRAMIDE 10 MG/1
10 TABLET ORAL
Status: DISCONTINUED | OUTPATIENT
Start: 2018-01-01 | End: 2018-01-01

## 2018-01-01 RX ORDER — ALPRAZOLAM 0.5 MG/1
0.5 TABLET ORAL EVERY 6 HOURS PRN
Status: DISCONTINUED | OUTPATIENT
Start: 2018-01-01 | End: 2018-01-01

## 2018-01-01 RX ORDER — MORPHINE SULFATE 4 MG/ML
INJECTION, SOLUTION INTRAMUSCULAR; INTRAVENOUS
Status: COMPLETED
Start: 2018-01-01 | End: 2018-01-01

## 2018-01-01 RX ORDER — CALCITRIOL 0.25 UG/1
0.25 CAPSULE, LIQUID FILLED ORAL DAILY
Status: DISCONTINUED | OUTPATIENT
Start: 2018-01-01 | End: 2018-01-01

## 2018-01-01 RX ORDER — SODIUM CHLORIDE 9 MG/ML
INJECTION, SOLUTION INTRAVENOUS ONCE
Status: COMPLETED | OUTPATIENT
Start: 2018-01-01 | End: 2018-01-01

## 2018-01-01 RX ORDER — LIDOCAINE HYDROCHLORIDE 10 MG/ML
0.5 INJECTION, SOLUTION INFILTRATION; PERINEURAL ONCE AS NEEDED
Status: ACTIVE | OUTPATIENT
Start: 2018-01-01 | End: 2018-01-01

## 2018-01-01 RX ORDER — HYDROCODONE BITARTRATE AND ACETAMINOPHEN 10; 325 MG/1; MG/1
1 TABLET ORAL EVERY 4 HOURS PRN
Status: DISCONTINUED | OUTPATIENT
Start: 2018-01-01 | End: 2018-01-01

## 2018-01-01 RX ORDER — ATROPINE SULFATE 10 MG/ML
1 SOLUTION/ DROPS OPHTHALMIC EVERY 4 HOURS PRN
Status: DISCONTINUED | OUTPATIENT
Start: 2018-01-01 | End: 2018-01-01

## 2018-01-01 RX ORDER — SODIUM CHLORIDE 9 MG/ML
INJECTION, SOLUTION INTRAVENOUS
Status: DISPENSED
Start: 2018-01-01 | End: 2018-01-01

## 2018-01-01 RX ORDER — AMIODARONE HYDROCHLORIDE 50 MG/ML
300 INJECTION, SOLUTION INTRAVENOUS ONCE
Status: DISCONTINUED | OUTPATIENT
Start: 2018-01-01 | End: 2018-01-01

## 2018-01-01 RX ORDER — CEFAZOLIN SODIUM/WATER 2 G/20 ML
SYRINGE (ML) INTRAVENOUS
Status: COMPLETED
Start: 2018-01-01 | End: 2018-01-01

## 2018-01-01 RX ORDER — ACETAMINOPHEN 650 MG/1
650 SUPPOSITORY RECTAL EVERY 6 HOURS PRN
Status: DISCONTINUED | OUTPATIENT
Start: 2018-01-01 | End: 2018-01-01

## 2018-01-01 RX ORDER — DEXAMETHASONE 4 MG/1
4 TABLET ORAL EVERY 8 HOURS SCHEDULED
Status: DISCONTINUED | OUTPATIENT
Start: 2018-01-01 | End: 2018-01-01

## 2018-01-01 RX ORDER — BISACODYL 10 MG
10 SUPPOSITORY, RECTAL RECTAL
Status: DISCONTINUED | OUTPATIENT
Start: 2018-01-01 | End: 2018-01-01

## 2018-01-01 RX ORDER — LORAZEPAM 2 MG/ML
1 INJECTION INTRAMUSCULAR EVERY 2 HOUR PRN
Status: DISCONTINUED | OUTPATIENT
Start: 2018-01-01 | End: 2018-01-01

## 2018-01-01 RX ORDER — FUROSEMIDE 20 MG/1
20 TABLET ORAL DAILY
Qty: 30 TABLET | Refills: 0 | Status: ON HOLD | COMMUNITY
Start: 2018-01-01 | End: 2018-01-01

## 2018-01-01 RX ORDER — LACTULOSE 10 G/15ML
20 SOLUTION ORAL ONCE
Status: DISCONTINUED | OUTPATIENT
Start: 2018-01-01 | End: 2018-01-01

## 2018-01-01 RX ORDER — METOPROLOL TARTRATE 5 MG/5ML
INJECTION INTRAVENOUS
Status: COMPLETED
Start: 2018-01-01 | End: 2018-01-01

## 2018-01-01 RX ORDER — MORPHINE SULFATE 20 MG/ML
15 SOLUTION ORAL
Status: DISCONTINUED | OUTPATIENT
Start: 2018-01-01 | End: 2018-01-01

## 2018-01-01 RX ORDER — SODIUM PHOSPHATE, DIBASIC AND SODIUM PHOSPHATE, MONOBASIC 7; 19 G/133ML; G/133ML
1 ENEMA RECTAL ONCE AS NEEDED
Status: DISCONTINUED | OUTPATIENT
Start: 2018-01-01 | End: 2018-01-01

## 2018-01-01 RX ORDER — SODIUM POLYSTYRENE SULFONATE 15 G/60ML
15 SUSPENSION ORAL; RECTAL ONCE
Status: COMPLETED | OUTPATIENT
Start: 2018-01-01 | End: 2018-01-01

## 2018-01-01 RX ORDER — ONDANSETRON 2 MG/ML
8 INJECTION INTRAMUSCULAR; INTRAVENOUS EVERY 6 HOURS SCHEDULED
Status: DISCONTINUED | OUTPATIENT
Start: 2018-01-01 | End: 2018-01-01

## 2018-01-01 RX ORDER — DILTIAZEM HYDROCHLORIDE 180 MG/1
360 CAPSULE, COATED, EXTENDED RELEASE ORAL DAILY
Status: DISCONTINUED | OUTPATIENT
Start: 2018-01-01 | End: 2018-01-01

## 2018-01-01 RX ORDER — DEXTROSE MONOHYDRATE 25 G/50ML
50 INJECTION, SOLUTION INTRAVENOUS AS NEEDED
Status: DISCONTINUED | OUTPATIENT
Start: 2018-01-01 | End: 2018-01-01

## 2018-01-01 RX ORDER — DIAZEPAM 2 MG/1
7 TABLET ORAL 2 TIMES DAILY
Qty: 60 TABLET | Refills: 0 | Status: SHIPPED | OUTPATIENT
Start: 2018-01-01 | End: 2018-01-01

## 2018-01-01 RX ORDER — METOPROLOL SUCCINATE 100 MG/1
100 TABLET, EXTENDED RELEASE ORAL DAILY
Status: DISCONTINUED | OUTPATIENT
Start: 2018-01-01 | End: 2018-01-01

## 2018-01-01 RX ORDER — MAGNESIUM OXIDE 400 MG (241.3 MG MAGNESIUM) TABLET
800 TABLET ONCE
Status: COMPLETED | OUTPATIENT
Start: 2018-01-01 | End: 2018-01-01

## 2018-01-01 RX ORDER — MIRTAZAPINE 30 MG/1
30 TABLET, FILM COATED ORAL NIGHTLY
Qty: 30 TABLET | Refills: 0 | Status: SHIPPED | OUTPATIENT
Start: 2018-01-01 | End: 2018-01-01

## 2018-01-01 RX ORDER — MORPHINE SULFATE 15 MG/1
15 TABLET, FILM COATED, EXTENDED RELEASE ORAL EVERY 8 HOURS SCHEDULED
Status: DISCONTINUED | OUTPATIENT
Start: 2018-01-01 | End: 2018-01-01

## 2018-01-01 RX ORDER — IBUPROFEN 400 MG/1
400 TABLET ORAL EVERY 6 HOURS PRN
Status: DISCONTINUED | OUTPATIENT
Start: 2018-01-01 | End: 2018-01-01

## 2018-01-01 RX ORDER — AMIODARONE HYDROCHLORIDE 200 MG/1
200 TABLET ORAL 2 TIMES DAILY WITH MEALS
Status: DISCONTINUED | OUTPATIENT
Start: 2018-01-01 | End: 2018-01-01

## 2018-01-01 RX ORDER — DIGOXIN 125 MCG
125 TABLET ORAL DAILY
Status: DISCONTINUED | OUTPATIENT
Start: 2018-01-01 | End: 2018-01-01

## 2018-01-01 RX ORDER — ONDANSETRON 2 MG/ML
4 INJECTION INTRAMUSCULAR; INTRAVENOUS
Status: DISCONTINUED | OUTPATIENT
Start: 2018-01-01 | End: 2018-01-01

## 2018-01-01 RX ORDER — LACTULOSE 10 G/15ML
20 SOLUTION ORAL 3 TIMES DAILY
Status: DISCONTINUED | OUTPATIENT
Start: 2018-01-01 | End: 2018-01-01

## 2018-01-01 RX ORDER — ONDANSETRON 4 MG/1
4 TABLET, FILM COATED ORAL EVERY 8 HOURS PRN
Status: DISCONTINUED | OUTPATIENT
Start: 2018-01-01 | End: 2018-01-01

## 2018-01-01 RX ORDER — DIPHENHYDRAMINE HCL 25 MG
25 CAPSULE ORAL EVERY 8 HOURS PRN
Status: DISCONTINUED | OUTPATIENT
Start: 2018-01-01 | End: 2018-01-01

## 2018-01-01 RX ORDER — MIRTAZAPINE 30 MG/1
30 TABLET, FILM COATED ORAL NIGHTLY
Status: DISCONTINUED | OUTPATIENT
Start: 2018-01-01 | End: 2018-01-01

## 2018-01-01 RX ORDER — METOPROLOL SUCCINATE 100 MG/1
100 TABLET, EXTENDED RELEASE ORAL DAILY
Qty: 30 TABLET | Refills: 1 | Status: SHIPPED | OUTPATIENT
Start: 2018-01-01

## 2018-01-01 RX ORDER — MORPHINE SULFATE 20 MG/ML
10 SOLUTION ORAL
Status: DISCONTINUED | OUTPATIENT
Start: 2018-01-01 | End: 2018-01-01

## 2018-01-01 RX ORDER — LORAZEPAM 0.5 MG/1
TABLET ORAL EVERY 4 HOURS PRN
Status: DISCONTINUED | OUTPATIENT
Start: 2018-01-01 | End: 2018-01-01

## 2018-01-01 RX ORDER — ONDANSETRON 2 MG/ML
4 INJECTION INTRAMUSCULAR; INTRAVENOUS ONCE
Status: COMPLETED | OUTPATIENT
Start: 2018-01-01 | End: 2018-01-01

## 2018-01-01 RX ORDER — MORPHINE SULFATE 2 MG/ML
1 INJECTION, SOLUTION INTRAMUSCULAR; INTRAVENOUS ONCE
Status: COMPLETED | OUTPATIENT
Start: 2018-01-01 | End: 2018-01-01

## 2018-01-01 RX ORDER — VALGANCICLOVIR 450 MG/1
900 TABLET, FILM COATED ORAL 2 TIMES DAILY
Qty: 84 TABLET | Refills: 1 | Status: SHIPPED | OUTPATIENT
Start: 2018-01-01 | End: 2018-01-01

## 2018-01-01 RX ORDER — MORPHINE SULFATE 15 MG/1
15 TABLET, FILM COATED, EXTENDED RELEASE ORAL EVERY 8 HOURS SCHEDULED
Qty: 90 TABLET | Refills: 0 | Status: SHIPPED | OUTPATIENT
Start: 2018-01-01 | End: 2018-01-01

## 2018-01-01 RX ORDER — DILTIAZEM HYDROCHLORIDE 60 MG/1
60 TABLET, FILM COATED ORAL EVERY 6 HOURS SCHEDULED
Status: DISCONTINUED | OUTPATIENT
Start: 2018-01-01 | End: 2018-01-01

## 2018-01-01 RX ORDER — DIAZEPAM 2 MG/1
2 TABLET ORAL EVERY 6 HOURS PRN
Status: DISCONTINUED | OUTPATIENT
Start: 2018-01-01 | End: 2018-01-01

## 2018-01-01 RX ORDER — ALPRAZOLAM 0.5 MG/1
0.5 TABLET ORAL 3 TIMES DAILY PRN
Status: DISCONTINUED | OUTPATIENT
Start: 2018-01-01 | End: 2018-01-01

## 2018-01-01 RX ORDER — LIDOCAINE HYDROCHLORIDE 20 MG/ML
INJECTION, SOLUTION EPIDURAL; INFILTRATION; INTRACAUDAL; PERINEURAL
Status: COMPLETED
Start: 2018-01-01 | End: 2018-01-01

## 2018-01-01 RX ORDER — SODIUM CHLORIDE 0.9 % (FLUSH) 0.9 %
3 SYRINGE (ML) INJECTION AS NEEDED
Status: DISCONTINUED | OUTPATIENT
Start: 2018-01-01 | End: 2018-01-01

## 2018-01-01 RX ORDER — PANTOPRAZOLE SODIUM 40 MG/1
40 TABLET, DELAYED RELEASE ORAL
Status: DISCONTINUED | OUTPATIENT
Start: 2018-01-01 | End: 2018-01-01

## 2018-01-01 RX ORDER — DOCUSATE SODIUM 100 MG/1
100 CAPSULE, LIQUID FILLED ORAL 2 TIMES DAILY
Status: DISCONTINUED | OUTPATIENT
Start: 2018-01-01 | End: 2018-01-01

## 2018-01-01 RX ORDER — MIRTAZAPINE 15 MG/1
30 TABLET, FILM COATED ORAL NIGHTLY
Status: DISCONTINUED | OUTPATIENT
Start: 2018-01-01 | End: 2018-01-01

## 2018-01-01 RX ORDER — ONDANSETRON 8 MG/1
8 TABLET, ORALLY DISINTEGRATING ORAL EVERY 6 HOURS SCHEDULED
Status: DISCONTINUED | OUTPATIENT
Start: 2018-01-01 | End: 2018-01-01

## 2018-01-01 RX ORDER — MORPHINE SULFATE 20 MG/ML
15 SOLUTION ORAL EVERY 2 HOUR PRN
Qty: 180 ML | Refills: 0 | Status: SHIPPED | OUTPATIENT
Start: 2018-01-01 | End: 2018-01-01

## 2018-01-01 RX ORDER — MORPHINE SULFATE 20 MG/ML
5 SOLUTION ORAL
Status: DISCONTINUED | OUTPATIENT
Start: 2018-01-01 | End: 2018-01-01

## 2018-01-01 RX ORDER — DIAZEPAM 5 MG/1
5 TABLET ORAL EVERY 6 HOURS PRN
Qty: 30 TABLET | Refills: 0 | Status: SHIPPED | OUTPATIENT
Start: 2018-01-01

## 2018-01-01 RX ORDER — AMIODARONE HYDROCHLORIDE 200 MG/1
200 TABLET ORAL DAILY
Status: DISCONTINUED | OUTPATIENT
Start: 2018-01-01 | End: 2018-01-01

## 2018-01-01 RX ORDER — MAGNESIUM OXIDE 400 MG (241.3 MG MAGNESIUM) TABLET
400 TABLET ONCE
Status: COMPLETED | OUTPATIENT
Start: 2018-01-01 | End: 2018-01-01

## 2018-01-01 RX ORDER — METOCLOPRAMIDE 10 MG/1
5 TABLET ORAL
Status: DISCONTINUED | OUTPATIENT
Start: 2018-01-01 | End: 2018-01-01

## 2018-01-01 RX ORDER — AMIODARONE HYDROCHLORIDE 50 MG/ML
150 INJECTION, SOLUTION INTRAVENOUS ONCE
Status: DISCONTINUED | OUTPATIENT
Start: 2018-01-01 | End: 2018-01-01 | Stop reason: RX

## 2018-01-01 RX ADMIN — DIPHENHYDRAMINE HCL 25 MG: 25 MG CAPSULE ORAL at 08:01:00

## 2018-01-01 RX ADMIN — DIPHENHYDRAMINE HCL 25 MG: 25 MG CAPSULE ORAL at 08:49:00

## 2018-01-01 RX ADMIN — ACETAMINOPHEN 325 MG: 325 TABLET ORAL at 08:00:00

## 2018-01-01 RX ADMIN — ACETAMINOPHEN 650 MG: 325 TABLET ORAL at 08:49:00

## 2018-01-01 RX ADMIN — DIPHENHYDRAMINE HCL 25 MG: 25 MG CAPSULE ORAL at 07:59:00

## 2018-01-17 PROBLEM — R53.83 OTHER FATIGUE: Status: ACTIVE | Noted: 2018-01-01

## 2018-01-17 PROBLEM — D50.9 IRON DEFICIENCY ANEMIA: Status: ACTIVE | Noted: 2018-01-01

## 2018-01-31 PROBLEM — F32.9 REACTIVE DEPRESSION: Status: ACTIVE | Noted: 2018-01-01

## 2018-02-15 PROBLEM — M79.605 LEFT LEG PAIN: Status: ACTIVE | Noted: 2018-01-01

## 2018-02-23 NOTE — PROGRESS NOTES
Patient here for Blood transfusion. Ambulated from waiting room with steady gait accompanied by her . Patient states she has been having fatigue and SOB with exertion and feels like her heart is pounding when she exerts herself.    She does have so

## 2018-02-28 NOTE — ED NOTES
Care assumed from triage. Patient presents with c/o racing heart, had first immunotherapy treatment for melanoma on Monday. Patient denies chest pain, denies shortness of breath. Pt vomited once this am. Febrile at home, 102.  Pt alert and interacting appro

## 2018-02-28 NOTE — ED PROVIDER NOTES
Patient Seen in: [facility] AND [facility] Emergency Department     History   Patient presents with:  Fever (infectious)    Stated Complaint: fever,cancer pt melanoma    HPI    40year old female with history of metastatic melanoma status post radiation to the lef tablet (15 mg total) by mouth every 6 (six) hours as needed for Pain. HYDROcodone-acetaminophen 5-325 MG Oral Tab,  Take 1 tablet by mouth every 6 (six) hours as needed for Pain.    Trametinib Dimethyl Sulfoxide (MEKINIST) 2 MG Oral Tab,  Take 2 mg by lulu HPI.    Physical Exam   ED Triage Vitals [02/28/18 0925]  BP: 123/63  Pulse: 168  Resp: 24  Temp: 98.4 °F (36.9 °C)  Temp src: Oral  SpO2: 99 %  O2 Device: None (Room air)    Current:BP 97/67   Pulse 125   Temp 98.4 °F (36.9 °C) (Temporal)   Resp 18   Ht 1 components within normal limits   URINALYSIS WITH CULTURE REFLEX - Abnormal; Notable for the following:     Clarity Urine Hazy (*)     Leukocyte Esterase Urine Small (*)     Ascorbic Acid Urine 40  (*)     WBC Urine 17 (*)     Bacteria Urine Moderate (*) Final Result    PROCEDURE: XR CHEST PA + LAT CHEST (CPT=71020)         COMPARISON: [address] [place] [address],     6/09/2017, 12:30. [facility], XR CHEST PA + LAT CHEST     (EKR=01209), 6/09/2017, 11:48. History of melanoma; Metastatic melanoma ([facility] [address] Bone metastasis ([facility] [address] Liver metastasis ([facility] [address] Therapeutic drug monitoring; Right inguinal pain; Thoracic back pain; Fatigue; Drug-induced fever; Microcytic anemia; Microcytosis;  Other fatigue; Iron deficienc acute management issues with the patient, and I explained the need for further follow-up evaluation and treatment. Risk:  Patient is at High risk of significant complication or comorbidity.         Disposition and Plan     Clinical Impression:  Drug-paul

## 2018-02-28 NOTE — ED INITIAL ASSESSMENT (HPI)
Pt has history melanoma, receiving immunotherapy last treatment Monday. No port.  Fever of 103 at home

## 2018-03-01 PROBLEM — R00.0 TACHYCARDIA: Status: ACTIVE | Noted: 2018-01-01

## 2018-03-06 PROBLEM — B37.0 THRUSH: Status: ACTIVE | Noted: 2018-01-01

## 2018-03-06 PROBLEM — T50.905A ITCHING DUE TO DRUG: Status: ACTIVE | Noted: 2018-01-01

## 2018-03-06 PROBLEM — R82.998 DARK URINE: Status: ACTIVE | Noted: 2018-01-01

## 2018-03-06 PROBLEM — L29.8 ITCHING DUE TO DRUG: Status: ACTIVE | Noted: 2018-01-01

## 2018-03-06 PROBLEM — M79.89 SWELLING OF BOTH LOWER EXTREMITIES: Status: ACTIVE | Noted: 2018-01-01

## 2018-04-02 PROBLEM — D64.9 ANEMIA: Status: ACTIVE | Noted: 2018-01-01

## 2018-04-04 NOTE — PROGRESS NOTES
Pt to infusion for 1 unit PRBCs. Arrived ambulating independently accompanied by her . Reports feeling weak today. Denies any dizziness or SOB. Pt has had transfusion in the past without complications.  Reports that she took 1 Norco 10/325 elaine costa

## 2018-04-23 PROBLEM — R14.0 ABDOMINAL DISTENSION: Status: ACTIVE | Noted: 2018-01-01

## 2018-04-23 PROBLEM — R74.8 ELEVATED LIVER ENZYMES: Status: ACTIVE | Noted: 2018-01-01

## 2018-05-02 NOTE — IMAGING NOTE
PT TO ULTRASOUND ROOM  SCANS COMPLETED 1308    HX TAKEN PROCEDURE EXPLAINED QUESTIONS ANSWERED. PT IS ON IMMUNOTHERAPY CANCER TREATMENT. THIS IS WEEK 9 OUT OF PLANNED 12 WEEKS (PER PTS ) .  PT STATES SHE IS VERY WEAK AND COMPLAINED OF NAUSEA U

## 2018-05-06 PROBLEM — R53.1 WEAKNESS: Status: ACTIVE | Noted: 2018-01-01

## 2018-05-06 PROBLEM — E87.20 METABOLIC ACIDOSIS: Status: ACTIVE | Noted: 2018-01-01

## 2018-05-06 PROBLEM — E87.1 HYPONATREMIA: Status: ACTIVE | Noted: 2018-01-01

## 2018-05-06 PROBLEM — R06.00 DYSPNEA, UNSPECIFIED TYPE: Status: ACTIVE | Noted: 2018-01-01

## 2018-05-06 PROBLEM — R18.0 MALIGNANT ASCITES: Status: ACTIVE | Noted: 2018-01-01

## 2018-05-06 PROBLEM — R73.9 HYPERGLYCEMIA: Status: ACTIVE | Noted: 2018-01-01

## 2018-05-06 PROBLEM — R79.89 AZOTEMIA: Status: ACTIVE | Noted: 2018-01-01

## 2018-05-06 PROBLEM — E87.2 METABOLIC ACIDOSIS: Status: ACTIVE | Noted: 2018-01-01

## 2018-05-06 NOTE — ED INITIAL ASSESSMENT (HPI)
Reports n/v and inability to tolerate po intake. Denies diarrhea/fevers. Reports weakness and SOB. Currently undergoing immunotherapy for melanoma.

## 2018-05-07 NOTE — ED NOTES
Pt to ER with c/o generalized fatigue and weakness accompanied by nausea, vomiting and anorexia. She has anasarca with abdominal distention and states she had a paracentesis on Wednesday where they removed approximately 3 liters of fluid.   Pt and her husb

## 2018-05-07 NOTE — PLAN OF CARE
Patient returned from IR with low B/P 94/54. Albumin 25% given and B/P up to 99/50. Sepsis alert trigger noted. Dr. Dillan Gee made aware. Will just watch patient. Not currently symptomatic. Plan for d/c home tomorrow if stable.

## 2018-05-07 NOTE — PLAN OF CARE
Patient Centered Care    • Patient preferences are identified and integrated in the patient's plan of care Progressing        Patient/Family Goals    • Patient/Family Long Term Goal Progressing    • Patient/Family Short Term Goal Progressing          Fredo

## 2018-05-07 NOTE — H&P
[facility] HOSPITALIST HISTORY AND PHYSICAL     CC: Patient presents with:  Nausea/vomiting       PCP: Shanda Chiu MD   ONC: Francia Dunaway    History of Present Illness:   Patient is a 40year old female with PMH sig for melanoma metastatic to the bone, lung, liver hashimotos thyroid       Review of Systems  10 point Comprehensive ROS reviewed and negative except for what's stated above.      OBJECTIVE:  /70 (BP Location: Left arm)   Pulse 108   Temp 98.1 °F (36.7 °C) (Oral)   Resp 22   Ht 5' 3\" (1.6 m)   W diffuse abdominal discomfort. Right kidney: The right kidney measures 10.1 cm in length and demonstrates no hydronephrosis or nephric calculi. Left Kidney:  The left kidney measures 11.1 cm in length and demonstrates no hydronephrosis or nephric calculi.  Yumiko Leger quadrant COMPLICATIONS: None. OTHER: Negative. CONCLUSION: Ultrasound guided paracentesis was performed.       Dictated by (CST): Dilip Leung on 5/02/2018 at 14:15     Approved by (CST): Dilip Leung on 5/02/2018 at 14:15          Xr Ches of the chest were obtained with non-ionic intravenous contrast material. Automated exposure control for dose reduction was used. Adjustment of the mA and/or kV was done based on the patient's size.  Iterative reconstruction technique for dose reduction was axillary nodes measure 1.3 x 1.4 cm and 1.3 x 1.7 cm (series 3, images 35 and 39).  LIMITED ABDOMEN: Within the parameters of the arterial phase of contrast-enhancement, the included upper abdomen is notable for innumerable hyperenhancing hepatic metastatic VOLUME/DESCRIPTION:            2.6 liters clear james fluid      PUNCTURE SITE:         Left lower quadrant  COMPLICATIONS:         None. OTHER:             Negative.       =====  CONCLUSION: Ultrasound guided paracentesis was performed.            PE avoid further crystalloids as this will likely third space, can give albumin if volume needed    Leukocytosis  - has chronic WBC elevation, anemia w/ onc notes concerning for possible BM involvement  - her WBC is up from prior bsl of 30s- ddx includes BM i

## 2018-05-07 NOTE — CONSULTS
Hematology/Oncology Consult Note        NAME: Humberto Stanley - ROOM: 719/810-B - MRN: [id number] - Age: 40year old - : 1980    Reason for Consult:  Metastatic melanoma     Patient with history of metastatic melanoma, under the care of primary onco fluorescence angiography,                thigh  Family History   Problem Relation Age of Onset   • Cancer Other      ovarian cancer ?    • Other Larwence Tima Mother      hashimotos thyroid       SOCIAL HISTORY:    Smoking status: Never Smoker    Smokeless tobacc 98   BUN  19  20   CREATSERUM  0.75  0.70   GFRAA  >60  >60   GFRNAA  >60  >60   CA  6.9*  6.6*   ALB  1.3*  1.2*   NA  130*  129*   K  5.0  5.2*   CL  97  96   CO2  18*  18*   ALKPHO  1,200*  1,032*   AST  95*  84*   ALT  30  28   BILT  2.5*  2.1*   TP  5

## 2018-05-07 NOTE — IMAGING NOTE
PT TO ULTRASOUND ROOM  SCANS COMPLETED BY Eve self    HX TAKEN    HX OF   MELANOMA   PT IS ON IMMUNOTHERAPY CANCER TREATMENT. THIS IS WEEK 9 OUT OF PLANNED 12 WEEKS (PER PTS ) .  PT STATES SHE IS VERY WEAK COLOR IS PALE LIPS PALE O2 AT

## 2018-05-07 NOTE — ED PROVIDER NOTES
[facility]: [facility]    History   Patient presents with:  Nausea/vomiting    Stated Complaint: vomiting cancer patient    HPI    Patient complains of weakness, vomiting, shortness of breath and increased abdominal swelling.   Patien tablet (4 mg total) by mouth every 8 (eight) hours as needed. LORazepam 0.5 MG Oral Tab,  Take 1-2 tablets (0.5-1 mg total) by mouth every 4 (four) hours as needed for Anxiety.    Compression Bandages Does not apply Misc,  Right leg compression stocking k PANEL (14) - Abnormal; Notable for the following:        Result Value    Glucose 105 (*)     Sodium 130 (*)     CO2 18 (*)     Calcium, Total 6.9 (*)     AST 95 (*)     Alkaline Phosphatase 1,200 (*)     Bilirubin, Total 2.5 (*)     Total Protein 5.3 (*) Final result                 Please view results for these tests on the individual orders.    ABORH (BLOOD TYPE)   ANTIBODY SCREEN   RAINBOW DRAW BLUE   RAINBOW DRAW LAVENDER   RAINBOW DRAW DARK GREEN   RAINBOW DRAW LIGHT GREEN   RAINBOW DRAW GOLD   RAINBOW 4/23/2018          ICD-10-CM Noted POA    * (Principal)Metastatic melanoma ([facility] [address] C79.9 4/11/2017 Unknown    Azotemia R79.89 5/6/2018 Yes    Dyspnea, unspecified type R06.00 5/6/2018     Hyperglycemia R73.9 5/6/2018 Yes    Hyponatremia E87.1 5/6/2018 Yes

## 2018-05-08 NOTE — DIETARY NOTE
Brief Nutrition Note:    Pt screened at risk by RN at admission due to poor po intake. Chart reviewed and noted probable hospice plans due to poor prognosis and stage IV cancer. Visited pt for supplement initiation. Pt agreed to CIB shake.   Adis harding

## 2018-05-08 NOTE — PROGRESS NOTES
Pt dropped to 86% on room air at rest.   Per SW, no oxygen test needed since pt sats too low at rest w/o oxygen.

## 2018-05-08 NOTE — PLAN OF CARE
Verbalizes/displays adequate comfort level or patient's stated pain goal Progressing      Patient preferences are identified and integrated in the patient's plan of care Progressing      Patient/Family Long Term Goal Progressing      Patient/Family Short T

## 2018-05-08 NOTE — CM/SW NOTE
5/8CM-The Patient's RN informed this Writer that the Patient's MD will assess the Patient in regards to a hospice consult.  This Writer informed the Patient's RN that I left a DME Face to Face Form needs is in the Patient’s chart to be completed and signed

## 2018-05-08 NOTE — PROGRESS NOTES
[facility] Hospitalist Progress Note     PCP: Dee Adkins MD    Chief Complaint: follow-up    Overnight/Interim Events:      SUBJECTIVE:  Pt s/p repeat para with 3.6 L removed, feels gain SOB like she is 'filling back up' desatting to 80s at rest and feels too Tartrate  25 mg Oral BID       ALPRAZolam, morphINE sulfate (PF), ondansetron HCl, HYDROcodone-acetaminophen, ibuprofen       Assessment/Plan:   40year old female with PMH sig for melanoma metastatic to the bone, lung, lymph nodes who presents with SOB, N pending above, pt needs home safety eval and O2 eval for dc, goals of care discussion ongoing - may dc w/ hopsice pending course, anticipate AM    Questions/concerns were discussed with patient and/or family by bedside.     Total Time spent with patient and

## 2018-05-08 NOTE — PHYSICAL THERAPY NOTE
PHYSICAL THERAPY EVALUATION - INPATIENT     Room Number: 454/454-A  Evaluation Date: 5/8/2018  Type of Evaluation: Initial   Physician Order: See Comment for Specific Order (home safety eval)    Presenting Problem: p/w SOB, fatigue, nausea/vomiting; s/p p  considering. Patient left supine in bed,  present, all needs in place, RN aware.  Patient's current functional deficits include generalized weakness, decreased activity tolerance, balance deficits, which are below the patient's pre-admission Comment: Right groin sentinel lymph node biopsy with                intraoperative lymphatic mapping   11/1/2016: OTHER SURGICAL HISTORY Right      Comment: Reconstruction of right knee defect with                medial descending genicular artery perforat room air with increased SOB and fatigue   bpm  Symptoms resolved with 2-3 min rest break    Post-ambulation in room with RW, seated at EOB:  SpO2 93% on 2L   bpm  Reported increased fatigue and weakness, improving with transfer to bed    AM-PAC GOALS    Goals to be met by: 5/15/18  Patient Goal Patient's self-stated goal is: to feel better   Goal #1 Patient is able to demonstrate supine - sit EOB @ level: supervision     Goal #1   Current Status    Goal #2 Patient is able to demonstrate transfers

## 2018-05-09 NOTE — PROGRESS NOTES
Hematology/Oncology  Progress Note        NAME: Lexus Verdin - ROOM: 454/454-A - MRN: [id number] - Age: 40year old - : 1980    Subjective:  No acute events overnight.      Medications:  • Pantoprazole Sodium  40 mg Oral QAM AC   • metoprolol Tart 0. 74  0.73   GFRAA  >60  >60  >60  >60   GFRNAA  >60  >60  >60  >60   CA  6.9*  6.6*  6.6*  6.5*   ALB  1.3*  1.2*   --    --    NA  130*  129*  130*  127*   K  5.0  5.2*  5.6*  5.9*   CL  97  96  97  94*   CO2  18*  18*  21*  18*   ALKPHO  1,200*  1,032*

## 2018-05-09 NOTE — CM/SW NOTE
5/9CM-MD orders received in regards to discharge planning [facility]. The Patient has [address] [place] and resides in [facility]. This Writer made a referral to [address] [place] [address] a network provider that [address] [place] [address].  The Patient's RN informed this Writer that the Patient martin

## 2018-05-09 NOTE — PHYSICAL THERAPY NOTE
Chart reviewed, discussed case with OT and RN. Pt refused OOB activities with OT earlier d/t fatigue and SOB at rest. Per chart and RN, pt hypotensive with SBP in 80's, she continues to feel fatigued and SOB, not appropriate for PT at this time.  PT to foll

## 2018-05-09 NOTE — DISCHARGE SUMMARY
[facility] Internal Medicine Discharge Summary   Patient ID:  Verlene Dubin  [id number]  5 year old  5/22/1980    Admit date: 5/6/2018    Discharge date and time: 5/9/2018     Attending Physician: Dustin Olvera MD     Primary Care Physician: Tracey Tamez MD well        Leukocytosis  - has chronic WBC elevation, anemia w/ onc notes concerning for possible BM involvement  - her WBC is up from prior bsl of 30s- ddx includes BM involvement, infection, reactive, other  - she denies TTP or fever- did request cx/ fl as: ZOFRAN  Take 1 tablet (4 mg total) by mouth every 8 (eight) hours as needed.             Consults: IP CONSULT TO HOSPITALIST  IP CONSULT TO FAMILY/INTERNAL MED  IP CONSULT TO ONCOLOGY  IP CONSULT TO INTERVENTIONAL RADIOLOGY  NURSING CONSULT TO Yang Richardson Abdomen W Imaging  (NHP=[id number])    Result Date: 5/7/2018  PROCEDURE: US PARACENTESIS ABDOMEN W IMAGING (ZLK=[id number])  COMPARISON: None. INDICATIONS: Ascites  DESCRIPTION:   The procedure and risks were explained to the patient, and consent was obtained.   The cavity. The catheter was advanced as the trocar was removed. The catheter was attached to suction drainage, fluid was removed and sent to the laboratory.   Following completion, the catheter was removed and a sterile dressing was placed over the puncture (CST):  Lilian Amado MD on 5/06/2018 at 20:00     Approved by (CST): Lilian Amado MD on 5/06/2018 at 20:05          Ct Chest Pain/pe (iv Only) Em    Result Date: 5/7/2018  PROCEDURE: CT CHEST PAIN/PE (IV ONLY) EM  COMPARISON: [facility], mass or obstructing lesion. MEDIASTINUM/ABIMAEL: Mediastinal lymphadenopathy has intervally progressed. There is a reference pretracheal lymph node measuring 2.2 x 1.7 cm (series 3, image 40).  CHEST WALL: Subcutaneous metastatic lesions are present extensivel 7. Trace bilateral pleural effusions, left larger than right. 8. Partially visualized intra-abdominal ascites. 9. Lesser incidental findings as above. A preliminary report was issued by the [address] [place] [address] Radiology teleradiology service.  There are no major d

## 2018-05-09 NOTE — PROCEDURES
[facility]  Procedure Note    Ayah Quiet Patient Status:  Inpatient    1980 MRN [id number]   Location [facility] Attending Manjit Black MD   [facility] Day [facility] 3 PCP Lori Mitchell MD     Procedure: [place] Floor

## 2018-05-09 NOTE — PLAN OF CARE
Shelley Pate  [id number]  5/22/1980    IR MD/ ROSALEE Pre-Procedure Plan  (Inpatients Only)    Date of IR Procedure: 5/9/2018  Procedure to be performed: Abdominal Pleurx    Room Modality: X-Ray  STAT/ Urgent: no, If NO = routine    Pathology: no  Conscious

## 2018-05-09 NOTE — OCCUPATIONAL THERAPY NOTE
Contact note    Patient is a 40year old female admitted 5/6/2018 for metastatic melanoma. RN aware of and approved intent to treat. Upon arrival, pt supine in bed w/  present. Pt completed bed mobility --log rolls within bed w/ SBA.  At this time, fred

## 2018-05-10 NOTE — DISCHARGE SUMMARY
[facility] Internal Medicine Discharge Summary   Patient ID:  Ayah Santos  [id number]  66 year old  5/22/1980    Admit date: 5/6/2018    Discharge date and time: 5/10/2018     Attending Physician: Manjit Black MD     Primary Care Physician: Lori Mitchell MD Home with [facility] and repeat labs. See details below.       SOB, N/V, recurrent ascites  - d/w Karis Cotto IR APN s/p repeat para 5/7 - 3.6 L out + cytology, total 193 WBC (15 per neutrophils)- discussed options for tx given ongoing recurrence  - d/w onc and IR- wi extremities, no c/c/e  Neuro: CN Intact, no focal deficits      Discharge meds     Medication List      CHANGE how you take these medications    metoprolol Tartrate 25 MG Tabs  Commonly known as:  LOPRESSOR  Take 0.5 tablets (12.5 mg total) by mouth 2 (two hypoechoic masses. The largest measures 3.8 x 4.1 x 3.5 cm The main portal vein demonstrates a slow systolic flow velocity of 41.1cm/second. Biliary System: No intra- or extrahepatic biliary ductal dilatation.   The common bile duct measures 3.5 mm at the p the catheter was removed and a sterile dressing was placed over the puncture site by the department nurse. The patient was given post procedure aftercare instructions by the nurse, and discharged in satisfactory condition.    FINDINGS:  FLUID VOLUME/DESCRI (cpt=71045)    Result Date: 5/6/2018  PROCEDURE: XR CHEST AP PORTABLE (CPT=71010) TIME: 19:35.   COMPARISON: [facility], XR CHEST PA + LAT CHEST (CPT=71020), 6/09/2017, 11:48.   [place], [place] CHEST PAIN PE W CONTRAST, 6/09/ Multiplanar reformats and maximum intensity projection images were created. FINDINGS: VASCULATURE: There is adequate opacification of the pulmonary arterial tree.  No suspicious filling defects are identified in the main, lobar, segmental, or proximal sub These demonstrate confluence of the right hepatic dome and throughout the left hepatic lobe. Many of the lesions are centrally necrotic. A particularly hypervascular segment IV lesion is seen to have a parasitized arterial blood supply.  Intraperitoneal asc

## 2018-05-10 NOTE — CM/SW NOTE
Plan is for discharge home today 5/10 with [facility]. HME has delivered a portable O2 tank to the pt's room. The pt. Is aware and agreeable.       [facility] AT [address] ext [phone]

## 2018-05-10 NOTE — PROGRESS NOTES
Hematology/Oncology  Progress Note        NAME: Pippa Maria - [facility] - MRN: [id number] - Age: 40year old - : 1980    Subjective:  Had abdominal pleurx placed yesterday.    Did not get discharged due to weakness yesterday     Medications 05/07/18   0439  05/08/18   0522  05/09/18   0853  05/10/18   0846   GLU  105*  98  85  88  86   BUN  19  20  20  20  22*   CREATSERUM  0.75  0.70  0.74  0.73  0.72   GFRAA  >60  >60  >60  >60  >60   GFRNAA  >60  >60  >60  >60  >60   CA  6.9*  6.6*  6.6*

## 2018-05-10 NOTE — OCCUPATIONAL THERAPY NOTE
Order received, chart reviewed. RN aware and approved intent to treat. Completed in collaboration with physical therapy. Upon arrival, pt supine in bed,  present --both pleasant. Pt w/ anticipated DC this afternoon to home.  Deferring treatment 2/2 f

## 2018-05-10 NOTE — PHYSICAL THERAPY NOTE
Chart reviewed, attempted to see patient. Patient reporting significant fatigue, has been ambulating to the bathroom 3x today, declining PT/OT services d/t fatigue.  Discussed with patient recommendation for assistive device and home modifications to decrea

## 2018-05-10 NOTE — PLAN OF CARE
Patient resting in bed. Pleurex dressing clean, dry and intact. Denies nausea and vomiting. Complained abdominal pain, PRN norco given. Oxygen at 2L sating at 95-96%. Denies SOB while at rest.  at the bedside overnight. Plan for discharge today.

## 2018-05-10 NOTE — FACE TO FACE NOTE
[facility] AND [facility]  Face to Face Encounter Note    Jaydon Durant Patient Status:  Inpatient    1980 MRN [id number]   Location [facility] 4W/SW/SE Attending Christa Schaeffer MD   Hosp Day # 4 PCP Judi Roberts MD       I, or a non-physician p speech therapy or continues to need occupational therapy. The patient is under my care, and I have initiated the establishment of the plan of care. This physician will be followed by a physician who will periodically review the plan of care.   The finding

## 2018-05-11 PROBLEM — R74.01 TRANSAMINITIS: Status: ACTIVE | Noted: 2018-01-01

## 2018-05-11 PROBLEM — R18.0 ASCITES, MALIGNANT: Status: ACTIVE | Noted: 2018-01-01

## 2018-05-11 PROBLEM — E87.5 HYPERKALEMIA: Status: ACTIVE | Noted: 2018-01-01

## 2018-05-11 PROBLEM — C79.31 BRAIN METASTASES (HCC): Status: ACTIVE | Noted: 2018-01-01

## 2018-05-11 PROBLEM — E72.20 HYPERAMMONEMIA (HCC): Status: ACTIVE | Noted: 2018-01-01

## 2018-05-11 PROBLEM — C79.31 BRAIN METASTASES: Status: ACTIVE | Noted: 2018-01-01

## 2018-05-11 NOTE — ED NOTES
Prelim Diff:  Neutrophil: 59  Bands: 26  Scott Bar: 1.0  Myeloctye: 1.0  Promyelocyte: 1.0  Blast: 1.0  Lymphocyte: 3.0  Monocyte: 8.0

## 2018-05-11 NOTE — ED INITIAL ASSESSMENT (HPI)
Pt has increased SOB, abdominal pain and back pain. Discharged from the hospital yesterday after having paracentesis. Eyes are jaundiced.

## 2018-05-11 NOTE — ED PROVIDER NOTES
Patient Seen in: [facility] AND [facility] Emergency Department    History   Patient presents with:  Dyspnea ABIOLA SOB (respiratory)    Stated Complaint:     HPI    28-year-old female with metastatic melanoma with metastases to the liver, lungs, lymph nodes, bone Gastrointestinal: Positive for abdominal pain and nausea. Genitourinary: Negative. Musculoskeletal: Negative. Skin: Negative. Neurological: Positive for weakness, light-headedness and headaches. Psychiatric/Behavioral: Positive for confusion. Sodium 124 (*)     Potassium 5.6 (*)     Chloride 91 (*)     CO2 17 (*)     BUN 29 (*)     Calcium, Total 6.4 (*)     BUN/CREA Ratio 33.3 (*)     Calculated Osmolality 266 (*)     All other components within normal limits   HEPATIC FUNCTION PANEL (7) - Ab Patient's CBC appears stable, leukocytosis is chronic. Patient's hepatic panel with worsening bilirubin and alkaline phosphatase. Patient's ammonia is elevated, given her confusion she will be given lactulose. Patient refused chest x-ray.   Urinalysis is PROCEDURE: CT BRAIN OR HEAD (CPT=70450)  COMPARISON: [facility], CT CHEST PAIN/PE (IV ONLY) EM, 5/06/2018, 21:52. INDICATIONS: Headache with increasing weakness. H/o metastatic melanoma. Melanoma.   TECHNIQUE: CT images were obtained withou CONCLUSION:  1. Small hyperdense 9 x 7 mm left frontal convexity cortical metastasis with subtle vasogenic edema. . 2. 2 additional small 6 mm metastatic nodules in the left basal ganglionic region without edema.  3. Small less than 5 mm cortical nodule left

## 2018-05-12 NOTE — CONSULTS
Pulmonary / Critical Care H&P/Consult       NAME: Keshawn Trinidad - ROOM: 220/220-A - MRN: [id number] - Age: 40year old - :  1980    Date of Admission: 2018  4:11 PM  Admission Diagnosis: Hyperkalemia [E87.5]  Hyperammonemia ([facility] [address] [E72.20]  Br file     Social History Main Topics   Smoking status: Never Smoker    Smokeless tobacco: Never Used    Alcohol use No    Comment: socially    Drug use: No    Sexual activity: Not on file     Other Topics Concern   None on file     Social History Narrative 1.1 oz (71.7 kg)  04/23/18 : 160 lb 8 oz (72.8 kg)  04/10/18 : 159 lb 9.6 oz (72.4 kg)        Intake/Output Summary (Last 24 hours) at 05/12/18 1035  Last data filed at 05/12/18 0353   Gross per 24 hour   Intake             1000 ml   Output              40 Lab  18   0439  18   1632  18   0432   ALT  28  40  41   AST  84*  109*  100*     ab.41//  Alk phos 1352; bili 2.9  LDH 1215  Uric acid 8.0    Imaging: ct head: 9mm met in L frontal cortex with subtle edema.  6mm lesion in L b

## 2018-05-12 NOTE — CONSULTS
[facility]  Report of Consultation    Cyndi Mahmood Patient Status:  Inpatient    1980 MRN [id number]   Location [facility] 2W/SW Attending Juliann Ren MD   Hosp Day # 1 PCP Maddison Grier MD     Reason for Consultation:  Rafa Snell never used smokeless tobacco. She reports that she does not drink alcohol or use drugs. Allergies: Other                       Comment:Bandage: redness and itching on the site.   Seasonal                    Comment:Sneezing, itchy throat, congestion pleurx  EXTREMITIES: BLE edema  NEUROLOGIC: alert and oriented x 3    Laboratory Data:      Lab Results  Component Value Date   WBC 59.5 05/12/2018   HGB 8.9 05/12/2018   HCT 30.7 05/12/2018    05/12/2018   CREATSERUM 0.79 05/12/2018   BUN 26 05/12/ Small hyperdense 9 x 7 mm left frontal convexity cortical metastasis with subtle vasogenic edema. .  2. 2 additional small 6 mm metastatic nodules in the left basal ganglionic region without edema.   3. Small less than 5 mm cortical nodule left frontal lobe,

## 2018-05-12 NOTE — PLAN OF CARE
DISCHARGE PLANNING    • Discharge to home or other facility with appropriate resources Not Progressing    Spoke with shelly abdalla. Patient not ready for hospice at this time, wants \"everthhing done\". Not sure if  on board at this time.  Rene

## 2018-05-12 NOTE — CONSULTS
[facility]    PATIENT'S NAME: Lili Clarke   ATTENDING PHYSICIAN: Gypsy Herbert MD   CONSULTING PHYSICIAN: Agnieszka Zimmerman MD   PATIENT ACCOUNT#:   [id number]    LOCATION:  [address] [place] [address] #:   [id number]       DATE OF BI organomegaly. EXTREMITIES:  Some lower extremity edema noted. NEUROLOGIC:  Moving all extremities. She was a little confused, thinking that I had been here yesterday, when I first walked in the room. Otherwise seemed appropriate and conversant.     LAB metastatic melanoma. Her concern for me was quite moving. I did discuss with the nurse also. Thank you very much for allowing me to see this patient. Dictated By Elizabeth Simmons.  Marino Tirado MD  d: 05/12/2018 12:38:48  t: 05/12/2018 12:53:27  Job [id number]

## 2018-05-12 NOTE — CONSULTS
Greer Nissen is a 40year old female.    Patient presents with:  Dyspnea ABIOLA SOB (respiratory)      HPI:    Metastatic melanoma not responding to immunotherapy;now profoundly high wbc    REVIEW OF SYSTEMS:   A comprehensive 11 point review of systems was edema, no clubbing, no cyanosis. NEURO:  No focal neurologic deficits. DERM:  Warm, dry, no rashes. IV Site: ok      IMPRESSION/PLAN:   1.  Profound wbc elevation from steroids and the stress of metastatic melanoma;certainly subtle biliary obstruction an mmol/L   Potassium 5.6 (H) 3.3 - 5.1 mmol/L   Chloride 91 (L) 95 - 110 mmol/L   CO2 17 (L) 22 - 32 mmol/L   BUN 29 (H) 8 - 20 mg/dL   Creatinine 0.87 0.50 - 1.50 mg/dL   Calcium, Total 6.4 (L) 8.5 - 10.5 mg/dL   BUN/CREA Ratio 33.3 (H) 10.0 - 20.0   Anion gout), inflammatory disorders (collagen vascular/autoimmune), acute hemorrhage/hemolysis, and myeloproliferative disorders. Clinical correlation and close follow up  is recommended.     Reviewed by Derek Roberts M.D.        -URIC ACID, SERUM   Result Value mmol/L   BUN/CREA Ratio 32.9 (H) 10.0 - 20.0   Calculated Osmolality 263 (L) 275 - 295 mOsm/kg   GFR, Non-African American >60 >=60   GFR, -American >60 >=60   -ASSAY, THYROID STIM HORMONE   Result Value Ref Range   TSH 4.92 0.45 - 5.33 uIU/mL   -CO Neutrophil Absolute 56.5 (H) 1.8 - 7.7 K/UL   Lymphocyte Absolute 0.6 (L) 1.0 - 4.0 K/UL   Monocyte Absolute 0.0 0.0 - 1.0 K/UL   Eosinophil Absolute 0.0 0.0 - 0.7 K/UL   Basophil Absolute 0.0 0.0 - 0.2 K/UL   Myelocyte Absolute Manual 2.38 (H) 0 K/UL

## 2018-05-12 NOTE — CONSULTS
[facility]    PATIENT'S NAME: Sanjana Lang   ATTENDING PHYSICIAN: Gpysy Pfeiffer MD   CONSULTING PHYSICIAN: Angela Rayo MD   PATIENT ACCOUNT#:   [id number]    [facility]:   [id number]       DATE OF ANU Supple. No JVD. No thyromegaly or lymphadenopathy. CHEST:  Symmetrical.  LUNGS:  A few rhonchi. No wheezes. HEART:  Regular rate and rhythm. Tachycardic. ABDOMEN:  Soft. No tenderness. Bowel sounds are positive. Positive ascites.   EXTREMITIES:  N

## 2018-05-12 NOTE — PROGRESS NOTES
05/11/18 2036   Clinical Encounter Type   Visited With Patient and family together   Routine Visit Introduction   Continue Visiting Yes   Crisis Visit Critical care   Patient's Supportive Strategies/Resources Spouse    Referral From Nurse   Referral To

## 2018-05-12 NOTE — PROGRESS NOTES
Attempted PICC Line insertion x 2 sticks to right arm. Unable to advance guidewire beyond axilla. Guide wire and site removed. Initiated 22g to L hand peripheral IV. Infusing well.  Will report to PICC Line RN's for Monday to take a look for PICC Line acc

## 2018-05-12 NOTE — H&P
[facility] Hospitalist H&P     CC: Patient presents with:  Dyspnea ABIOLA SOB (respiratory)     PCP: Rudy Benz MD    ASSESSMENT / PLAN:     Ms. sEequiel Cantor is a 40year old female with PMH sig for melanoma metastatic to the bone, lung, lymph nodes who was admitted continue to follow patient while in house    Patient and/or patient's family given opportunity to ask questions and note understanding and agreeing with therapeutic plan as outlined    Teri Zhong MD  [facility] Hospitalist  Answering Service number: [phone] for the 5/11/18 encounter [facility] Encounter).       Soc Hx     Smoking status: Never Smoker    Smokeless tobacco: Never Used    Alcohol use No    Comment: socially        Fam Hx  Family History   Problem Relation Age of Onset   • Cancer Other      ovarian ALB 1.4 05/11/2018   ALKPHO 1,550 05/11/2018   BILT 4.1 05/11/2018   TP 5.6 05/11/2018    05/11/2018   ALT 40 05/11/2018   PTT 40.2 05/11/2018   INR 1.6 05/11/2018   LIP 21 05/11/2018   MG 2.3 05/11/2018       No results for input(s): TROP in the

## 2018-05-12 NOTE — PLAN OF CARE
GASTROINTESTINAL - ADULT    • Maintains or returns to baseline bowel function Not Progressing        Impaired Activities of Daily Living    • Achieve highest/safest level of independence in self care Not Progressing        Impaired Functional Mobility    • that is used to help drain fluid r/t ascites. Port is currently clamped and located in right abdomen. Dressing is clean/dry/intact. Not yet due to be changed. Patient's  reports that she is due to have this accessed to have fluid drained.  No orders

## 2018-05-12 NOTE — PROGRESS NOTES
[facility] Hospitalist Progress Note     CC: Hospital Follow up    PCP: Rudy Benz MD       Assessment/Plan:     Principal Problem:    Brain metastases [facility])  Active Problems:    Metastatic melanoma ([facility] [address]    Hyperammonemia (HCC)    Transaminitis    Hyperkalemia will consult palliative care on Monday     Chronic tachycardia  - present since prior to ca diagnosis  - on BB, decreased to 12.5 BID for BP  - bsl HR 110s per pt     GOC  - patient hospice appropriate  - d/w patient and family, > 15 mins, continue to want in all extremities  Neuro: Grossly normal, CN intact, sensory intact  Psych: Affect- normal  SKIN: warm, dry  EXT: no edema    Data Review:       Labs:     Recent Labs   Lab  05/10/18   0509  05/11/18   1632  05/12/18   0432   RBC  3.96  4.66  4.09   HGB (CST): Brannon Morales MD on 5/11/2018 at 7:17     Approved by (CST): Brannon Morales MD on 5/11/2018 at 7:23              Meds:     • sodium chloride 0.9%  500 mL Intravenous Once   • nystatin  5 mL Oral QID   • dexamethasone  4 mg Oral Q8H [facility]   • al

## 2018-05-12 NOTE — CONSULTS
Neurosurgery Consult Note    _______________________ PATIENT HISTORY _______________________    CC: Brain metastasis    HPI: A neurosurgery consult was requested by Dr Stephen Diallo.    (05/12/18):  The patient is a 45yo WF who has a history of metastatic melan Comment:Bandage: redness and itching on the site. Seasonal                    Comment:Sneezing, itchy throat, congestion      MEDICATIONS:    No current facility-administered medications on file prior to encounter.    Current Outpatient Prescriptions on Fi Gait:  Deferred      Inspection, palpation and percussion of joints, bone, and muscle/tendons noted above reveal no misalignment, asymmetry, tenderness or masses except as noted: NONE    ROM of the joints, bones and muscle/tendons noted above reveal no cre brain lesions. At this time, the patient does not need ICU care from my standpoint and may progress towards the floor and home once cleared by the medicine team for her abdominal complaints - her confusion seems better today.   The patient should go home o

## 2018-05-13 NOTE — PROGRESS NOTES
Spring Swanson is a 40year old female. Patient presents with:  Dyspnea ABIOLA SOB (respiratory)      HPI:    Looks a little less ill after ascites drainage    REVIEW OF SYSTEMS:   A comprehensive 11 point review of systems was completed.   Pertinent positiv distended  EXTREMITIES:  No edema, no clubbing, no cyanosis. NEURO:  No focal neurologic deficits. DERM:  Warm, dry, no rashes. IV Site: ok        IMPRESSION/PLAN:   1.  Profound wbc elevation from steroids and the stress of metastatic melanoma;certainly Magnesium 2.3 1.8 - 2.5 mg/dL   -BASIC METABOLIC PANEL (8)   Result Value Ref Range   Glucose 130 (H) 70 - 99 mg/dL   Sodium 124 (L) 136 - 144 mmol/L   Potassium 5.6 (H) 3.3 - 5.1 mmol/L   Chloride 91 (L) 95 - 110 mmol/L   CO2 17 (L) 22 - 32 mmol/L   BUN infection/sepsis, drugs/hormones (G-CSF, corticosteroids, epinephrine, lithium, toxins/poisons/venoms), tissue necrosis (infarct, trauma, burns, acute gout), inflammatory disorders (collagen vascular/autoimmune), acute hemorrhage/hemolysis, and myeloprolif Total Protein 4.9 (L) 5.9 - 8.4 g/dL   Albumin 1.2 (L) 3.5 - 4.8 g/dL   Globulin 3.7 2.5 - 3.7 g/dL   A/G Ratio 0.3 (L) 1.0 - 2.0   Anion Gap 14 0 - 18 mmol/L   BUN/CREA Ratio 32.9 (H) 10.0 - 20.0   Calculated Osmolality 263 (L) 275 - 295 mOsm/kg   GFR, Result Value Ref Range   Procalcitonin 2.49 (H) <=0.11 ng/mL   -REDRAW POTASSIUM (P)   Result Value Ref Range   Potassium 5.9 (H) 3.3 - 5.1 mmol/L   -BASIC METABOLIC PANEL (8)   Result Value Ref Range   Glucose 138 (H) 70 - 99 mg/dL   Sodium 125 (L) 136 Alkaline Phosphatase 1,278 (H) 32 - 100 U/L   Bilirubin, Total 3.0 (H) 0.3 - 1.2 mg/dL   Total Protein 4.9 (L) 5.9 - 8.4 g/dL   Albumin 1.2 (L) 3.5 - 4.8 g/dL   Globulin 3.7 2.5 - 3.7 g/dL   A/G Ratio 0.3 (L) 1.0 - 2.0   Anion Gap 11 0 - 18 mmol/L   BUN/ (H) 0 K/UL   -CBC W/ DIFFERENTIAL   Result Value Ref Range   WBC 59.5 (H) 4.0 - 11.0 K/UL   RBC 4.09 3.70 - 5.40 M/UL   HGB 8.9 (L) 12.0 - 16.0 g/dL   HCT 30.7 (L) 35.0 - 48.0 %   MCV 75.2 (L) 80.0 - 100.0 fL   MCH 21.8 (L) 27.0 - 32.0 pg   MCHC 29.0 (L) 3

## 2018-05-13 NOTE — PROGRESS NOTES
[facility]    Progress Note    Lexus Verdin Patient Status:  Inpatient    1980 MRN [id number]   Location [facility] 2W/SW Attending Maksim Rivera MD   Hosp Day # 2 PCP Marquita Coronel MD       SUBJECTIVE:    Zhao Cantor, ALT 41 05/13/2018   INR 1.6 05/13/2018   TERELL 25 05/13/2018   LIP 21 05/13/2018   MG 2.0 05/13/2018   PHOS 1.7 05/13/2018         Imaging:  Ct Brain Or Head ([id number])    Result Date: 5/11/2018  CONCLUSION:  1.  Small hyperdense 9 x 7 mm left frontal convexit mg 4 mg Intravenous TID AC and HS   HYDROcodone-acetaminophen (NORCO)  MG per tab 1 tablet 1 tablet Oral Q4H PRN   LORazepam (ATIVAN) tab 0.5-1 mg 0.5-1 mg Oral Q4H PRN   metoprolol Tartrate (LOPRESSOR) tab 12.5 mg 12.5 mg Oral BID         Assessment nondistended, no organomegaly, bowel sounds present  MSK: Full range of motion in extremities, no clubbing, no cyanosis  Skin: no rashes or lesions    Data Review:     Labs:     Lab Results  Component Value Date   WBC 50.2 05/13/2018   WBC 50.2 05/13/2018 Current Facility-Administered Medications:  diphenhydrAMINE (BENADRYL) cap/tab 25 mg 25 mg Oral Q8H PRN   0.9%  NaCl infusion  Intravenous Continuous   nystatin (MYCOSTATIN) suspension 500,000 Units 5 mL Oral QID   Piperacillin Sod-Tazobactam So (Karel Gottron

## 2018-05-13 NOTE — PROGRESS NOTES
Dr. Alondra Freeman made aware picc rn unable to advance cath. Phlebotomy attempted multiple to draw labs at this time. Dr. Mortimer Fine also made aware. Labs to be drawn at morning labs at this time.

## 2018-05-13 NOTE — SPIRITUAL CARE NOTE
Patient is eating breakfast with  at bedside. She would like a visit later today.  will visit later if possible and inform incoming  of visit request if needed.

## 2018-05-13 NOTE — PROGRESS NOTES
[facility] Hospitalist Progress Note     CC: Hospital Follow up    PCP: Micheal Jordan MD       Assessment/Plan:     Principal Problem:    Brain metastases [facility])  Active Problems:    Metastatic melanoma ([facility] [address]    Hyperammonemia (HCC)    Transaminitis    Hyperkalemia family declined hospice on last admission  - [facility], meets criteria for hospice  - to f/u with onc, RN to provide resources re palliative after dc  - will consult palliative care on Monday     Chronic tachycardia  - present (72.8 kg)      Exam   GEN: ill appearing female, more comfortable than yesterday  HEENT: EOMI, PERRLA, O2 NC  Neck: Supple, no JVD  Pulm: CTAB, no crackles or wheezes  CV: tachycardic, regular rhythm, no murmurs   ABD: Soft, non-tender, non-distended, +BS hemorrhage or midline shift.      Dictated by (CST): Tl Jackson MD on 5/11/2018 at 17:46     Approved by (CST): Tl Jackson MD on 5/11/2018 at 18:01          Xr Chest Ap Portable  (cpt=71045)    Result Date: 5/12/2018  CONCLUSION:  [place]

## 2018-05-13 NOTE — PLAN OF CARE
Problem: PAIN - ADULT  Goal: Verbalizes/displays adequate comfort level or patient's stated pain goal  INTERVENTIONS:  - Encourage pt to monitor pain and request assistance  - Assess pain using appropriate pain scale  - Administer analgesics based on type needed  - Evaluate fluid balance   Outcome: Progressing  Pt has no complaints of nausea, no vomiting noted. Pt has scheduled Zofran doses. IVF. Adequate PO intake.      Problem: METABOLIC/FLUID AND ELECTROLYTES - ADULT  Goal: Electrolytes maintained within Spirometry  - Assess the need for suctioning and perform as needed  - Assess and instruct to report SOB or any respiratory difficulty  - Respiratory Therapy support as indicated  - Manage/alleviate anxiety  - Monitor for signs/symptoms of CO2 retention   O

## 2018-05-13 NOTE — PROGRESS NOTES
[address] Patient Status:  Inpatient    1980 MRN [id number]   Location [facility] 2W/SW Attending Julia Gregorio MD   Hosp Day # 2 PCP Yessi Rivers MD     Pulm / Critical Care Progress Note     S: overall she feel 8.9*   HCT  35.6  30.7*  30.0*  30.0*   PLT  462*  416*  322  322     Recent Labs   Lab  05/11/18   1632  05/13/18   0452   INR  1.6*  1.6*         Recent Labs   Lab  05/12/18   0432  05/12/18   1152  05/12/18   1407  05/13/18   0452   NA  123*  124*  125*

## 2018-05-13 NOTE — PLAN OF CARE
Patient shortness of breath much better after removing ascitic fluid from abdomen via catheter. Patient tolerated well.

## 2018-05-13 NOTE — CM/SW NOTE
KATHARINE received call from [address] [place] [address] [phone] who stated the pt had been preparing for their services. Pt was readmitted after 1 day and is now in critical care as Full Code.  SW/DINA will need to f/u with the pt/family to clarify their r

## 2018-05-14 NOTE — PROGRESS NOTES
[facility]  Progress Note    Cyndi Mahmood Patient Status:  Inpatient    1980 MRN [id number]   Location [facility] 2W/SW Attending Juliann Ren MD   Hosp Day # 3 PCP Maddison Grier MD     Subjective:  Cyndi Mahmood is a(n) 40 ye and when brain mets addressed.     2. Brain metastasis  Appreciate input from Neurosurgery  No neurosurgical intervention recommended.   She is on steroids- will continue  MRI brain - per rad onc she will need WBRT, will not be helpful at this point   Radia

## 2018-05-14 NOTE — PROGRESS NOTES
Pulmonary Progress Note     Assessment / Plan:  1. Metastatic melanoma - found to have brain mets. Also with malignant ascites  - steroids  - drain PleurX prn  - per onc  2.  Hyperkalemia, elevated uric acid, elevated LDH - concern for tumor lysis  - allopu

## 2018-05-14 NOTE — PROGRESS NOTES
[facility] Hospitalist Progress Note     CC: Hospital Follow up    PCP: Zarina Coleman MD       Assessment/Plan:     Principal Problem:    Brain metastases [facility])  Active Problems:    Metastatic melanoma ([facility] [address]    Hyperammonemia (HCC)    Transaminitis    Hyperkalemia SBP, no other localized infection found, WBC stable, follow up with heme   - ID consulted, started on empiric zosyn, elevated PCT     Metastatic melanoma  - sees Avis Ballard, onc  - family declined hospice on last admission  - [facility], poor p Gross per 24 hour   Intake             2955 ml   Output             3000 ml   Net              -45 ml       Last 3 Weights  05/12/18 1200 : 147 lb 9.6 oz (67 kg)  05/09/18 1422 : 158 lb 1.1 oz (71.7 kg)  05/06/18 2332 : 158 lb (71.7 kg)  05/06/18 1823 : 15 Peritoneal drainage catheter with tip in the left mid abdomen. 2. Mild gaseous distention of bowel without mechanical obstruction. 3. Diffuse pulmonary metastases.      Dictated by (CST): Ritika Cavazos MD on 5/14/2018 at 11:07     Approved by (CST): Winston Plasencia,

## 2018-05-14 NOTE — PROGRESS NOTES
NEPHROLOGY DAILY PROGRESS NOTE       Keshawn Trinidad Patient Status:  Inpatient    1980 MRN [id number]   Location [facility] 2W/SW Attending Corazon James MD   Hosp Day # 3 PCP Karon Dutta MD     Follow up Reason: Hyponatremia     SUBJE tablet 1 tablet Oral Q4H PRN   LORazepam (ATIVAN) tab 0.5-1 mg 0.5-1 mg Oral Q4H PRN   metoprolol Tartrate (LOPRESSOR) tab 12.5 mg 12.5 mg Oral BID       Prescriptions Prior to Admission:  metoprolol Tartrate 25 MG Oral Tab Take 0.5 tablets (12.5 mg total) right and left hemithorax overall about the same. No large pleural effusion or large consolidation. Correlate clinically.     Dictated by (CST): Carrie Barbour MD on 5/12/2018 at 15:17     Approved by (CST): Carrie Barbour MD on 5/12/2018 at 15:19

## 2018-05-14 NOTE — PROGRESS NOTES
[facility] Infectious Disease  Progress Note    Jess Palmer Patient Status:  Inpatient    1980 MRN [id number]   Location [facility] 2W/SW Attending Raf Arce MD   Hosp Day # 3 PCP Weston Andrews MD     Subjective:  Nils Toro left frontal convexity cortical metastasis with subtle vasogenic edema. .  2. 2 additional small 6 mm metastatic nodules in the left basal ganglionic region without edema. 3. Small less than 5 mm cortical nodule left frontal lobe, suspect metastasis. Vania Bedoya   4.

## 2018-05-15 PROBLEM — E46 HYPOALBUMINEMIA DUE TO PROTEIN-CALORIE MALNUTRITION (HCC): Status: ACTIVE | Noted: 2018-01-01

## 2018-05-15 PROBLEM — M54.50 CHRONIC BILATERAL LOW BACK PAIN WITHOUT SCIATICA: Status: ACTIVE | Noted: 2018-01-01

## 2018-05-15 PROBLEM — G89.29 CHRONIC BILATERAL LOW BACK PAIN WITHOUT SCIATICA: Status: ACTIVE | Noted: 2018-01-01

## 2018-05-15 PROBLEM — R63.0 ANOREXIA: Status: ACTIVE | Noted: 2018-01-01

## 2018-05-15 PROBLEM — F41.9 ANXIETY: Status: ACTIVE | Noted: 2018-01-01

## 2018-05-15 PROBLEM — R06.02 SHORTNESS OF BREATH: Status: ACTIVE | Noted: 2018-01-01

## 2018-05-15 PROBLEM — E88.09 HYPOALBUMINEMIA DUE TO PROTEIN-CALORIE MALNUTRITION (HCC): Status: ACTIVE | Noted: 2018-01-01

## 2018-05-15 PROBLEM — E43 SEVERE PROTEIN-CALORIE MALNUTRITION (HCC): Status: ACTIVE | Noted: 2018-01-01

## 2018-05-15 NOTE — PROGRESS NOTES
NEPHROLOGY DAILY PROGRESS NOTE       Nancy Murrell Patient Status:  Inpatient    1980 MRN [id number]   Location [facility] 2W/SW Attending Justin Hill MD   Hosp Day # 4 PCP Betsy Michaud MD     Follow up Reason: Hyponatremia     SUBJE Intravenous TID AC and HS   HYDROcodone-acetaminophen (NORCO)  MG per tab 1 tablet 1 tablet Oral Q4H PRN   LORazepam (ATIVAN) tab 0.5-1 mg 0.5-1 mg Oral Q4H PRN   metoprolol Tartrate (LOPRESSOR) tab 12.5 mg 12.5 mg Oral BID       Prescriptions Prior to 129 this AM   - follow Na levels     Hyperkalemia   - improved    - low K diet     Hypophosphatemia   - likely due to poor PO intake   - encourage PO intake if able   - started on neutra-phos TID   - follow phos levels      Hypocalcemia   - ionized calc

## 2018-05-15 NOTE — PROGRESS NOTES
[facility] AND [facility] Infectious Disease Progress Note    Anne Arshad Patient Status:  Inpatient    1980 MRN [id number]   Location [facility] 4W/SW/SE Attending Jose Guadalupe Londono MD   Hosp Day # 4 PCP Xin Greer MD     Subjective:  Pa x3.  Cooperative. No apparent distress. Vital Signs:  Blood pressure 114/72, pulse 115, temperature 97.6 °F (36.4 °C), temperature source Oral, resp. rate 18, weight 147 lb 9.6 oz (67 kg), SpO2 92 %, not currently breastfeeding.    Temp (24hrs), Av °F p. o. BID x 21 days, followed by daily if viremia does not clear quickly     4. Disposition - inpatient. Prognosis poor due to advanced disease. We can certainly consider lifelong suppression with p.o. augmentin for occult GI infection risk. Continue p.

## 2018-05-15 NOTE — CONSULTS
[address] Patient Status:  Inpatient    1980 MRN [id number]   Location [facility] 4W/SW/SE Attending Rick Timmons MD   Hosp Day # 4 PCP Luna Thornton MD     Date of Con Norco and this helps ease the pain. Appetite has been decreased however she notes a mild recent improvement. Current BMI is 26, weight is 67 Kg.  Denies any constipation/diarrhea issues and admitted to having a good BM this AM. Mood has been depressed per p Comment:Sneezing, itchy throat, congestion    Medications:     Current Facility-Administered Medications:   •  potassium & sodium phosphates (NEUTRA-PHOS) 280-160-250 MG powder packet 1 packet, 1 packet, Oral, TID CC  •  calcium gluconate 1 g in sodiu 05/15/2018    (H) 05/15/2018   CA 6.0 (LL) 05/15/2018   ALB 1.5 (L) 05/15/2018   ALKPHO 1,734 (H) 05/14/2018   BILT 2.4 (H) 05/14/2018   TP 4.7 (L) 05/14/2018    (H) 05/14/2018   ALT 48 05/14/2018   DDIMER >4.00 (H) 05/06/2018   MG 2.1 05/14/ preferred to have symptoms managed first  Advance Directive: None              Pre-existing DNR/DNI Order: No  Describe Patient Wishes: FULL CODE STATUS    Spiritual needs addressed: Unable to assess    Disposition: home    FULL CODE STATUS    Assessment/R

## 2018-05-15 NOTE — PROGRESS NOTES
[facility] Hospitalist Progress Note     CC: Hospital Follow up    PCP: Rudy Kurtz MD       Assessment/Plan:     Principal Problem:    Brain metastases [facility])  Active Problems:    Metastatic melanoma ([facility] [address]    Hyperammonemia (HCC)    Transaminitis    Hyperkalemia will d/w palliative for recommendations for anxiety    Hypocalcemia  - initially thought to be due to possible tumor lysis  - other tumor lysis labs improving however calcium remains low  - low ionized calcium yesterday, continue repletion  - will check PT counseling patient face to face     Subjective:     Feels a little better. Only ate small amount of food yesterday. OBJECTIVE:    Blood pressure 114/72, pulse 115, temperature 97.6 °F (36.4 °C), temperature source Oral, resp.  rate 18, weight 147 lb 9.6 0.63   --   0.60   GFRAA  >60  >60  >60  >60   --   >60   GFRNAA  >60  >60  >60  >60   --   >60   CA  6.1*  6.1*  5.8*  5.8*   --   6.0*   NA  126*  126*  127*  127*  126*  129*   K  4.7  4.7  5.0  5.0   --   4.6   CL  95  95  94*  94*   --   98   CO2  20 LORazepam

## 2018-05-15 NOTE — PROGRESS NOTES
[facility] AND CLINICS  Progress Note    Shanda Del Valle Patient Status:  Inpatient    1980 MRN [id number]   Location [facility] 2W/SW Attending Jarvis Moura MD   Hosp Day # 4 PCP Nate Chery MD     Subjective:  Shanda Del Valle is a(n) 40 ye hypocalcemia possible  tumor lysis could be a factor.  -appreciate nephrology input      4. Goals of care  Pt/ clear that they want to be aggressive about continued treatment  Palliative care services for discharge may be beneficial, not ready for h

## 2018-05-16 NOTE — CM/SW NOTE
5/16CM- The Patient is current has home oxygen supplied by [facility]. The Patient is current with [facility] ([phone] fax [phone]) and requested Nicki Aceves to send out Patient updates. [facility] Resumption orders are needed.       Jennie

## 2018-05-16 NOTE — PROGRESS NOTES
[facility] AND [facility]  Progress Note    Brenna Mejía Patient Status:  Inpatient    1980 MRN [id number]   Location [facility] 2W/SW Attending Qian Blanco MD   Hosp Day # 5 PCP Sujey Cuenca MD     Subjective:  Brenna Mejía is a(n) 40 ye imbalance  Hyperkalemia, hypocalcemia possible  tumor lysis could be a factor.  -appreciate nephrology input     4.  Malignant Ascites  - requiring daily drainage, discussed with nursing about limiting to 2.5- 3L daily  - will need to have albumin replaceme

## 2018-05-16 NOTE — PROGRESS NOTES
NEPHROLOGY DAILY PROGRESS NOTE       Philip Better Patient Status:  Inpatient    1980 MRN [id number]   Location [facility] 2W/SW Attending Blanca Addison MD   Hosp Day # 5 PCP Vitor Castro MD     Follow up Reason: Hyponatremia     SUBJE Intravenous TID AC and HS   HYDROcodone-acetaminophen (NORCO)  MG per tab 1 tablet 1 tablet Oral Q4H PRN   metoprolol Tartrate (LOPRESSOR) tab 12.5 mg 12.5 mg Oral BID       Prescriptions Prior to Admission:  metoprolol Tartrate 25 MG Oral Tab Take 0 25- hydroxy Vit D level  - follow calcium levels     Recurrent Ascites s/p pleurx catheter   - to receive albumin today     Dicussed with primary     We will continue to follow [address] [place] [address] Holiday.  Pura Edmond, [address] [facility] - Nephrology

## 2018-05-16 NOTE — CONSULTS
[place] [facility] - [facility]  Palliative Care Follow Up    Cyndi Mahmood Patient Status:  Inpatient    1980 MRN [id number]   Location [facility] 4W/SW/SE Attending Juliann Ren MD   Hosp Day # 5 PCP Maddison Grier MD     Date of Consult: Valium 2 mg po Q 6 prn anxiety    Given last night around 9 PM    Remeron 15 mg po Q HS    Given last night    She is looking forward to her daughter finishing school for the semester so she can spend time with her    Review of Systems:  Pertinent item Value Date   INR 1.6 (H) 05/14/2018   PTT 40.2 (H) 05/11/2018       Chemistry:  Lab Results  Component Value Date   CREATSERUM 0.56 05/16/2018   BUN 20 05/16/2018    (L) 05/16/2018   K 4.2 05/16/2018   CL 99 05/16/2018   CO2 20 (L) 05/16/2018   GLU 1 ([facility]      Hypoalbuminemia due to protein-calorie malnutrition ([facility]      Palliative Performance Scale 40%  -    Emotion support provided to patient/family today: Yes      Palliative Care Follow Up:    A total of 15 minutes were spent on this consult, christina

## 2018-05-16 NOTE — PROGRESS NOTES
[facility] Hospitalist Progress Note     CC: Hospital Follow up    PCP: Edita Dang MD       Assessment/Plan:     Principal Problem:    Brain metastases [facility])  Active Problems:    Metastatic melanoma ([facility] [address]    Hyperammonemia (HCC)    Transaminitis    Hyperkalemia like she needs to urinate, has ramon in place  - ramon flushed with saline without change, then exchanged on 5/14 by RN  - US bladder ordered, showed large amounts of ascites but no urinary retention    Anxiety  - tried ativan, states it just makes her loo patient's clinical course.   [facility] hospitalist to continue to follow patient while in house     Patient and/or patient's family given opportunity to ask questions and note understanding and agreeing with therapeutic plan as outlined     Jonel Urrutia MD 28.7*  28.3*   MCV  73.4*  73.4*  74.1*  72.6*   MCH  21.8*  21.8*  21.7*  21.9*   MCHC  29.7*  29.7*  29.2*  30.2*   RDW  24.3*  24.3*  23.9*  24.0*   WBC  44.8*  44.8*  45.7*  38.0*   PLT  279  279  316  340         Recent Labs   Lab  05/15/18   0520  05

## 2018-05-17 NOTE — PROGRESS NOTES
NEPHROLOGY DAILY PROGRESS NOTE       Connor Stanley Patient Status:  Inpatient    1980 MRN [id number]   Location [facility] 2W/SW Attending Christo Gavin MD   Hosp Day # 6 PCP Nirav Traore MD     Follow up Reason: Hyponatremia     SUBJE dexamethasone (DECADRON) tab 4 mg 4 mg Oral Q8H [address]   allopurinol (ZYLOPRIM) tab 300 mg 300 mg Oral Daily   ondansetron HCl (ZOFRAN) injection 4 mg 4 mg Intravenous TID AC and HS   HYDROcodone-acetaminophen (NORCO)  MG per tab 1 tablet 1 tablet Kristie Duarte levels      Hypocalcemia   - improving after replacement, check   -  PTH elevated, Check 25- hydroxy Vit D level  - follow calcium levels     Recurrent Ascites s/p pleurx catheter   - receiving albumin PRN    Dicussed with primary      We will continue to

## 2018-05-17 NOTE — PROGRESS NOTES
[facility] Hospitalist Progress Note     CC: Hospital Follow up    PCP: Elmer Cox MD       Assessment/Plan:     Principal Problem:    Brain metastases [facility])  Active Problems:    Metastatic melanoma ([facility] [address]    Hyperammonemia (HCC)    Transaminitis    Hyperkalemia she needs to urinate, has ramon in place  - ramon flushed with saline without change, then exchanged on 5/14 by RN  - US bladder ordered, showed large amounts of ascites but no urinary retention    Anxiety  - tried ativan, states it just makes her loopy an hospitalist to continue to follow patient while in house     Patient and/or patient's family given opportunity to ask questions and note understanding and agreeing with therapeutic plan as outlined     Melanie Bro MD  [facility] Hospitalist  Answering servic 05/16/18   0547  05/16/18   0940  05/17/18   0645   GLU  207*  186*  139*   BUN  19  20  17   CREATSERUM  0.56  0.56  0.43*   GFRAA  >60  >60  >60   GFRNAA  >60  >60  >60   CA  6.0*  6.1*  6.3*   NA  128*  128*  131*   K  4.3  4.2  3.7   CL  99  99  100

## 2018-05-17 NOTE — PROGRESS NOTES
[facility] AND CLINICS  Progress Note    Cyndi Mahmood Patient Status:  Inpatient    1980 MRN [id number]   Location [facility] 2W/SW Attending Juliann Ren MD   [facility] Day # 6 PCP Maddison Grier MD     Subjective:  Cyndi Mahmood is a(n) 40 ye Bilirubin      0.3 - 1.2 mg/dL 2.1 (H) 2.4 (H) 3.0 (H) 2.9 (H)     Component      Latest Ref Rng & Units 5/7/2018 5/6/2018 5/1/2018 4/23/2018   Total Bilirubin      0.3 - 1.2 mg/dL 2.1 (H) 2.5 (H) 1.53 1.04     Component      Latest Ref Rng & Units 4/10/20 infection risk     8. Discharge planning  -When stable  -Outpatient follow up with Dr Linette Ybarra, also follows at [facility].     [facility] Hematology/Oncology

## 2018-05-17 NOTE — CONSULTS
[place] [facility] - [facility]  Palliative Care Follow Up    Rip Flaquito Patient Status:  Inpatient    1980 MRN [id number]   Location [facility] 4W/SW/SE Attending Peña Sosa MD   [facility] Day # 6 PCP Luna Thornton MD     Date of Consult: 5 Comment:Sneezing, itchy throat, congestion    Medications:     Current Facility-Administered Medications:   •  sodium phosphate 30 mEq in sodium chloride 0.9 % 250 mL IVPB, 30 mEq, Intravenous, Once  •  calciTRIOL (ROCALTROL) cap 0.25 mcg, 0.25 mcg, BUN 17 05/17/2018    (L) 05/17/2018   K 3.7 05/17/2018    05/17/2018   CO2 23 05/17/2018    (H) 05/17/2018   CA 6.3 (L) 05/17/2018   ALB 2.1 (L) 05/17/2018   ALKPHO 1,620 (H) 05/16/2018   BILT 2.1 (H) 05/16/2018   TP 4.8 (L) 05/16/2018 Metastatic melanoma (HCC)      Hyperammonemia (HCC)      Transaminitis      Hyperkalemia      Ascites, malignant      Anxiety      Chronic bilateral low back pain without sciatica      Anorexia      Severe protein-calorie malnutrition ([address]      Hypoalbumi

## 2018-05-17 NOTE — PROGRESS NOTES
[facility] AND [facility] Infectious Disease Progress Note    Connor Stanley Patient Status:  Inpatient    1980 MRN [id number]   Location [facility] 4W/SW/SE Attending Romie Coates MD   Hosp Day # 6 PCP Nirav Traore MD     Subjective:  Pt HYDROcodone-acetaminophen (NORCO)  MG per tab 1 tablet, 1 tablet, Oral, Q4H PRN  •  metoprolol Tartrate (LOPRESSOR) tab 12.5 mg, 12.5 mg, Oral, BID    Physical Exam:  General: Alert, orientated x3. Cooperative. No apparent distress.   Vital Signs: 500mg QD indefinitely and complete course of PO valcyte on d/c    If you have any questions or concerns please call G-ID at [phone].      MABLE COX NP  5/17/2018  9:05 AM

## 2018-05-17 NOTE — CONSULTS
[facility]    Report of Endocrinology Consultation  [facility]    Pippa Crow Patient Status:  Inpatient    1980 MRN [id number]   Location [facility] 4W/SW/SE Attending Misael Villasenor MD   [facility] Day # 6 P congestion    Medications:    Current Facility-Administered Medications:   •  sodium phosphate 30 mEq in sodium chloride 0.9 % 250 mL IVPB, 30 mEq, Intravenous, Once  •  calciTRIOL (ROCALTROL) cap 0.25 mcg, 0.25 mcg, Oral, Daily  •  guaiFENesin (ROBITUSSIN Exam:  Blood pressure 90/49, pulse 89, temperature 97.6 °F (36.4 °C), temperature source Oral, resp. rate 18, weight 147 lb 9.6 oz (67 kg), SpO2 96 %, not currently breastfeeding. General: Alert, orientated x3. Cooperative. No apparent distress.     Zuhair Smith

## 2018-05-18 NOTE — PROGRESS NOTES
Lexus Verdin is a 40year old female. Patient presents with:  Dyspnea ABIOLA SOB (respiratory)      HPI:    Feeling a little better and tolerating some po intake    REVIEW OF SYSTEMS:   A comprehensive 11 point review of systems was completed.   Pertinent rales, or wheezes. CARDIO: RRR U8/J1, no rubs, clicks, heaves, or murmurs. GI:  Soft NT/ND, BS present, No masses , rebound, no HSM. EXTREMITIES:  No edema, no clubbing, no cyanosis. NEURO:  No focal neurologic deficits. DERM:  Warm, dry, no rashes. mmol/L   Calculated Osmolality 266 (L) 275 - 295 mOsm/kg   GFR, Non-African American >60 >=60   GFR, -American >60 >=60   -HEPATIC FUNCTION PANEL (7)   Result Value Ref Range    (H) 15 - 41 U/L   ALT 40 14 - 54 U/L   Alkaline Phosphatase 1,55 Acid 7.9 (H) 2.1 - 7.4 mg/dL   -PHOSPHORUS   Result Value Ref Range   Phosphorus 2.3 (L) 2.4 - 4.7 mg/dL   -ARTERIAL BLOOD GAS   Result Value Ref Range   Sample Site Left Radial    ABG pH 7.41 7.35 - 7.45   ABG pCO2 27 (L) 35 - 45 mm Hg   ABG PO2 69 (L) 80 Value Ref Range   Cortisol 13.3 mcg/dL   -OSMOLALITY, URINE   Result Value Ref Range   Osmolality Urine 714 300 - 1,100 mOsm/kg   -BASIC METABOLIC PANEL (8)   Result Value Ref Range   Glucose 130 (H) 70 - 99 mg/dL   Sodium 124 (L) 136 - 144 mmol/L   [place] Calcium, Total 6.0 (LL) 8.5 - 10.5 mg/dL   BUN/CREA Ratio 39.1 (H) 10.0 - 20.0   Anion Gap 15 0 - 18 mmol/L   Calculated Osmolality 267 (L) 275 - 295 mOsm/kg   GFR, Non-African American >60 >=60   GFR, -American >60 >=60   -URIC ACID, SERUM   Resu Value Ref Range   Magnesium 2.0 1.8 - 2.5 mg/dL   -PHOSPHORUS   Result Value Ref Range   Phosphorus 1.7 (L) 2.4 - 4.7 mg/dL   -PROTHROMBIN TIME (PT)   Result Value Ref Range   PT 18.6 (H) 11.8 - 14.5 seconds   INR 1.6 (H) 0.9 - 1.2   -CMV ABS IGG/IGM   Res 5.9 - 8.4 g/dL   Albumin 1.1 (L) 3.5 - 4.8 g/dL   Globulin 3.6 2.5 - 3.7 g/dL   A/G Ratio 0.3 (L) 1.0 - 2.0   Anion Gap 13 0 - 18 mmol/L   BUN/CREA Ratio 42.9 (H) 10.0 - 20.0   Calculated Osmolality 270 (L) 275 - 295 mOsm/kg   GFR, Non-African American >60 (L) 2.4 - 4.7 mg/dL   -ALBUMIN SERUM   Result Value Ref Range   Albumin 1.5 (L) 3.5 - 4.8 g/dL   -RENAL FUNCTION PANEL   Result Value Ref Range   Glucose 207 (H) 70 - 99 mg/dL   Sodium 128 (L) 136 - 144 mmol/L   Potassium 4.3 3.3 - 5.1 mmol/L   Chloride 99 Result Value Ref Range   Glucose 139 (H) 70 - 99 mg/dL   Sodium 131 (L) 136 - 144 mmol/L   Potassium 3.7 3.3 - 5.1 mmol/L   Chloride 100 95 - 110 mmol/L   CO2 23 22 - 32 mmol/L   BUN 17 8 - 20 mg/dL   Creatinine 0.43 (L) 0.50 - 1.50 mg/dL   Calcium, Tota Ref Range   Blood Culture Result No Growth 5 Days    -BLOOD CULTURE   Result Value Ref Range   Blood Culture Result No Growth 5 Days    -CBC W/ DIFFERENTIAL   Result Value Ref Range   WBC 55.8 (H) 4.0 - 11.0 K/UL   RBC 4.66 3.70 - 5.40 M/UL   HGB 10.1 (L) M/UL   HGB 8.9 (L) 12.0 - 16.0 g/dL   HCT 30.0 (L) 35.0 - 48.0 %   MCV 74.7 (L) 80.0 - 100.0 fL   MCH 22.1 (L) 27.0 - 32.0 pg   MCHC 29.6 (L) 32.0 - 37.0 g/dl   RDW 24.3 (H) 11.0 - 15.0 %    140 - 400 K/UL   MPV 7.6 7.4 - 10.3 fL   Neutrophil % 85 % Band % 4 0-10 % %   Metamyelocyte % 2 %   Myelocyte % 4 %   Nucrbc 2 (H) <1 %   Neutrophil Absolute 33.4 (H) 1.8 - 7.7 K/UL   Lymphocyte Absolute 0.4 (L) 1.0 - 4.0 K/UL   Monocyte Absolute 1.9 (H) 0.0 - 1.0 K/UL   Eosinophil Absolute 0.0 0.0 - 0.7 K/UL (H) 0.0 - 1.0 K/UL   Eosinophil Absolute 0.0 0.0 - 0.7 K/UL   Basophil Absolute 0.0 0.0 - 0.2 K/UL   Toxic Granulation Few (A)    Vacuolization Moderate (A)    Anisocytosis 3+ (A)    Microcytosis 1+    Polychromasia 1+

## 2018-05-18 NOTE — CM/SW NOTE
5/18CM-Micah informed this Writer that the Patient's copay is $1591.00 for the medication. This Writer has sent the medicatation to Ellen Justice for pricing. Case Management to update as information is obtained. 2:23p. m.5/18CM-The Patient's MD in

## 2018-05-18 NOTE — PROGRESS NOTES
[facility] Hospitalist Progress Note     CC: Hospital Follow up    PCP: Lori Mitchell MD       Assessment/Plan:     Principal Problem:    Brain metastases [facility])  Active Problems:    Metastatic melanoma ([facility] [address]    Hyperammonemia (HCC)    Transaminitis    Hyperkalemia with eating  - KUB without obstruction  - feels like she needs to urinate, has ramon in place  - ramon flushed with saline without change, then exchanged on 5/14 by RN  - US bladder ordered, showed large amounts of ascites but no urinary retention  - will due to brain meds  Lines: PIV     Dispo: pending hospital course     Further recommendations pending patient's clinical course.   [facility] hospitalist to continue to follow patient while in house     Patient and/or patient's family given opportunity to ask quest 30.2*  29.8*  29.7*   RDW  24.0*  23.7*  24.2*   WBC  38.0*  26.7*  31.4*   PLT  340  284  320         Recent Labs   Lab  05/16/18   0940  05/17/18   0645  05/18/18   0649   GLU  186*  139*  138*   BUN  20  17  14   CREATSERUM  0.56  0.43*  0.40*   GFRAA

## 2018-05-18 NOTE — PROGRESS NOTES
[facility] AND [facility]  Progress Note    Maldonadodonniemoraima Scott Patient Status:  Inpatient    1980 MRN [id number]   Location [facility] 2W/SW Attending Rodger Stack MD   Hosp Day # 7 PCP Elmer Cox MD     Subjective:  Maldonadobg Tyler is a(n) 40 ye 5/12/2018   Total Bilirubin      0.3 - 1.2 mg/dL 2.1 (H) 2.4 (H) 3.0 (H) 2.9 (H)     Component      Latest Ref Rng & Units 5/7/2018 5/6/2018 5/1/2018 4/23/2018   Total Bilirubin      0.3 - 1.2 mg/dL 2.1 (H) 2.5 (H) 1.53 1.04     Component      Latest Ref R po augmentin for GI infection risk     8. Discharge planning  -When stable  -Outpatient follow up with Dr Sarita Cogan, also follows at [facility].     [facility] Hematology/Oncology

## 2018-05-18 NOTE — CONSULTS
[place] [facility] - [facility]  Palliative Care Follow Up    Efra Gabriel Patient Status:  Inpatient    1980 MRN [id number]   Location [facility] 4W/SW/SE Attending Kerline George MD   Hosp Day # 7 PCP Richard Astudillo MD     Date of Consult: 5 and she is encouraged by this    She requested the Morphine IV for abdominal pain  She admitted to me that the Thao Roberts is working well for her back pain    She is willing to try Integrative Medicine at our clinic at the [facility].   She has Valeritas's phone number to Intravenous, Q6H PRN  •  dexamethasone (DECADRON) tab 4 mg, 4 mg, Oral, Q8H HUGO  •  allopurinol (ZYLOPRIM) tab 300 mg, 300 mg, Oral, Daily  •  ondansetron HCl (ZOFRAN) injection 4 mg, 4 mg, Intravenous, TID AC and HS  •  HYDROcodone-acetaminophen (NORCO) 1 to share with her   Patient's decision making preferences: self  Code status: FULL CODE STATUS  Have advanced directives been discussed with patient or healthcare power of : NO  Advance Directive: None              Pre-existing DNR/DNI Order

## 2018-05-18 NOTE — PROGRESS NOTES
NEPHROLOGY DAILY PROGRESS NOTE       Pippa Maria Patient Status:  Inpatient    1980 MRN [id number]   Location [facility] 2W/SW Attending Carlos Alberto Nelson MD   Hosp Day # 7 PCP Zarina Coleman MD     Follow up Reason: Hyponatremia     SUBJE Sod-Tazobactam So (ZOSYN) 3.375 g in dextrose 5 % 100 mL ADD-vantage 3.375 g Intravenous Q8H   Normal Saline Flush 0.9 % injection 3 mL 3 mL Intravenous PRN   acetaminophen (TYLENOL) tab 650 mg 650 mg Oral Q6H PRN   ondansetron HCl (ZOFRAN) injection 4 mg hyponatremia.   -  Na stable at 131 today.    - will give dose of albumin today   - follow Na levels     Hyperkalemia   - improved    - low K diet      Hypophosphatemia   - likely due to poor PO intake   - encourage PO intake if able   - IV replacement orde

## 2018-05-19 NOTE — PHYSICAL THERAPY NOTE
2:23 PM:Followed up in PM. Pt refused as her family is here. Educated pt ton the importance of mobilities and she expressed understanding. Will follow up tomorrow AM    12: PM Attempted to see pt earlier today for PT.  Requested to be seen later in PM. Will

## 2018-05-19 NOTE — PROGRESS NOTES
[facility] AND [employer]  Progress Note    Marcia Nurse Patient Status:  Inpatient    1980 MRN [id number]   Location [facility] 4W/SW/SE Attending Zhao Woodard MD   Hosp Day # 8 PCP Virgil Hercules MD     Subjective:  Feeling better.  More stren input   - PTH elevated secondary to vitamin D deficiency from liver failure  - appreciate endocrine input  - c/w calcitriol, recheck this am.      4. Malignant Ascites  - requiring daily drainage, limiting to 2.5- 3L daily  - will need to have albumin repl

## 2018-05-19 NOTE — PROGRESS NOTES
[facility] Hospitalist Progress Note     CC: Hospital Follow up    PCP: Elmer Cox MD       Assessment/Plan:     Principal Problem:    Brain metastases [facility])  Active Problems:    Metastatic melanoma ([facility] [address]    Hyperammonemia (HCC)    Transaminitis    Hyperkalemia obstruction  - feels like she needs to urinate, has ramon in place  - ramon flushed with saline without change, then exchanged on 5/14 by RN  - US bladder ordered, showed large amounts of ascites but no urinary retention  - will try reglan as per d/w Dr Shrestha Brood pt     GOC  - patient hospice appropriate but pt is not ready   - d/w patient and family,  - FULL code status     FN:  - IVF: none  - Diet: general     DVT Prophy: SCD, no HSQ due to brain meds  Lines: PIV     Dispo: pending hospital course     Further rec 05/16/18   0547  05/17/18   0645  05/18/18   0649   RBC  3.90  3.41*  3.69*   HGB  8.5*  7.4*  8.0*   HCT  28.3*  24.9*  26.8*   MCV  72.6*  73.0*  72.8*   MCH  21.9*  21.8*  21.6*   MCHC  30.2*  29.8*  29.7*   RDW  24.0*  23.7*  24.2*   WBC  38.0*  26.7*

## 2018-05-19 NOTE — PROGRESS NOTES
NEPHROLOGY DAILY PROGRESS NOTE       Pippa Maria Patient Status:  Inpatient    1980 MRN [id number]   Location [facility] 2W/SW Attending Carlos Alberto Nelson MD   Hosp Day # 8 PCP Zarina Coleman MD     Follow up Reason: Hyponatremia     SUBJE cap/tab 25 mg 25 mg Oral Q8H PRN   nystatin (MYCOSTATIN) suspension 500,000 Units 5 mL Oral QID   Normal Saline Flush 0.9 % injection 3 mL 3 mL Intravenous PRN   acetaminophen (TYLENOL) tab 650 mg 650 mg Oral Q6H PRN   ondansetron HCl (ZOFRAN) injection 4 hyponatremia.    -Repeat labs tomorrow    Hyperkalemia   - improved    - low K diet      Hypophosphatemia   - likely due to poor PO intake   - encourage PO intake -calcitriol will help    Hypocalcemia   -  PTH elevated, Vit D low   - Endo following, started

## 2018-05-19 NOTE — CM/SW NOTE
5/19-MD orders received in regards to outpatient antibiotics For the valcyte, she might qualify for the coupon card: [email or web address] I've also used their assistance program in the past. Case Management this Writer has or

## 2018-05-19 NOTE — PROGRESS NOTES
[facility] AND [facility] Infectious Disease Progress Note    Brenna Mejía Patient Status:  Inpatient    1980 MRN [id number]   Location [facility] 4W/SW/SE Attending Osvaldo Duque MD   Hosp Day # 8 PCP Sujey Cuenca MD     Subjective:  Pt and HS  •  HYDROcodone-acetaminophen (NORCO)  MG per tab 1 tablet, 1 tablet, Oral, Q4H PRN  •  metoprolol Tartrate (LOPRESSOR) tab 12.5 mg, 12.5 mg, Oral, BID    Physical Exam:  General: Alert, orientated x3. Cooperative. No apparent distress.   Vit

## 2018-05-20 NOTE — PLAN OF CARE
Impaired Activities of Daily Living    • Achieve highest/safest level of independence in self care Not Progressing        Impaired Functional Mobility    • Achieve highest/safest level of mobility/gait Not Progressing          CARDIOVASCULAR - ADULT    • M

## 2018-05-20 NOTE — PROGRESS NOTES
[facility]    CDI Prediction Tool Protocol (Vancomycin Initiated)    OVP (oral vancomycin prophylaxis) 125 mg PO Daily is being started in this patient based on a score of 13.       Score Breakdown:  High risk antibiotic use (5 points)  Hospital

## 2018-05-20 NOTE — PLAN OF CARE
GASTROINTESTINAL - ADULT    • Maintains or returns to baseline bowel function Not Progressing        Impaired Activities of Daily Living    • Achieve highest/safest level of independence in self care Not Progressing        Impaired Functional Mobility    •

## 2018-05-20 NOTE — PROGRESS NOTES
NEPHROLOGY DAILY PROGRESS NOTE       Horaciokavita Cunninghamas Patient Status:  Inpatient    1980 MRN [id number]   Location [facility] 2W/SW Attending Troy Mireles MD   [address] Day # 9 PCP Abeba Cyr MD     Follow up Reason: Hyponatremia     SUBJE Normal Saline Flush 0.9 % injection 10 mL 10 mL Intravenous PRN   Enoxaparin Sodium (LOVENOX) 40 MG/0.4ML injection 40 mg 40 mg Subcutaneous Daily   mirtazapine (REMERON) tab 15 mg 15 mg Oral Nightly   valGANciclovir HCl (VALCYTE) tab TABS 900 mg 900 mg 05/20/2018       ASSESSMENT AND PLAN:   This is a 40year old female with metastatic melanoma with recurrent ascites s/p pleurx catheter. 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  Nephrology has been consulted for hyponatremia and hyperkalemia    Hyponatremia - hypovo

## 2018-05-20 NOTE — PROGRESS NOTES
[facility] AND [facility] Infectious Disease  Progress Note    Rip Huddleston Patient Status:  Inpatient    1980 MRN [id number]   Location [facility] 4W/SW/SE Attending Peña Sosa MD   Hosp Day # 9 PCP Luna Thornton MD     Subjective:  Pa Plan:  1.  Profound WBC elevation from steroids and metastatic disease  - Biliary issues also possibly contributing due to widespread metastasis, ascites  - WBCs have been elevated for many months, at 23K today and stable/trending down  - Empiric zosyn deon

## 2018-05-20 NOTE — PROGRESS NOTES
[facility] Hospitalist Progress Note     CC: Hospital Follow up    PCP: Teresa Thompson MD       Assessment/Plan:     Principal Problem:    Brain metastases [facility])  Active Problems:    Metastatic melanoma ([facility] [address]    Hyperammonemia (HCC)    Transaminitis    Hyperkalemia obstruction  - feels like she needs to urinate, has ramon in place  - ramon flushed with saline without change, then exchanged on 5/14 by RN  - US bladder ordered, showed large amounts of ascites but no urinary retention  - will try reglan as per d/w Dr Neisha Gregg 12.5 BID for BP  - bsl HR 110s per pt     GOC  - patient hospice appropriate but pt is not ready   - d/w patient and family,  - FULL code status     FN:  - IVF: none  - Diet: general     DVT Prophy: SCD, no HSQ due to brain meds  Lines: PIV     Dispo: pend Labs:     Recent Labs   Lab  05/17/18   0645  05/18/18   0649  05/20/18   0510   RBC  3.41*  3.69*  3.50*   HGB  7.4*  8.0*  7.6*   HCT  24.9*  26.8*  25.5*   MCV  73.0*  72.8*  73.0*   MCH  21.8*  21.6*  21.7*   MCHC  29.8*  29.7*  29.7*   RDW  23.7*  2

## 2018-05-20 NOTE — PHYSICAL THERAPY NOTE
PHYSICAL THERAPY EVALUATION - INPATIENT     Room Number: 461/461-A  Evaluation Date: 5/20/2018  Type of Evaluation: Initial   Physician Order: PT Eval and Treat    Presenting Problem: Melanoma with mets to the bone,liver with malignant ascites, lung, lymp decided to push through. Their goal is to get stronger, however with her current diagnosis and condition may limit her from returning to her true baseline functional skills.  I think we can consider to max her her functional strength for quality of life and Hyperammonemia (HCC)    Transaminitis    Hyperkalemia    Ascites, malignant    Anxiety    Chronic bilateral low back pain without sciatica    Anorexia    Severe protein-calorie malnutrition (HCC)    Hypoalbuminemia due to protein-calorie malnutrition ([address] ASSESSMENT  Upper extremity ROM and strength are within functional limits     Lower extremity ROM is within functional limits     Lower extremity strength is B LE: RLE: 3/5 and L LE 4-/5    BALANCE  Static Sitting: Good  Dynamic Sitting: Fair  Static Stand to/from Stand at assistance level: minimum assistance with walker - rolling     Goal #2  Current Status    Goal #3 Patient is able to ambulate 25 feet with assist device: walker - rolling at assistance level: moderate assistance/min A   Goal #3   Current S

## 2018-05-20 NOTE — PROGRESS NOTES
[facility] AND [facility]  Progress Note    Tristin Mullen Patient Status:  Inpatient    1980 MRN [id number]   Location [facility] 4W/SW/SE Attending Irlanda Mcgraw MD   Hosp Day # 9 PCP Lucien Dhillon MD     Subjective:  Feeling better.  More stren be a factor.  -appreciate nephrology input   - PTH elevated secondary to vitamin D deficiency from liver failure  - appreciate endocrine input  - c/w calcitriol, recheck this am.      4. Malignant Ascites  - requiring daily drainage, limiting to 2.5- 3L da

## 2018-05-21 NOTE — PROGRESS NOTES
NEPHROLOGY DAILY PROGRESS NOTE       Josh Velazquez Patient Status:  Inpatient    1980 MRN [id number]   Location [facility] 4W/SW/SE Attending Tuan Washburn MD   Hosp Day # 8 PCP Teresa Thompson MD     Follow up Reason: Hyponatremia     MORENO Subcutaneous Daily   mirtazapine (REMERON) tab 15 mg 15 mg Oral Nightly   valGANciclovir HCl (VALCYTE) tab TABS 900 mg 900 mg Oral BID   diphenhydrAMINE (BENADRYL) cap/tab 25 mg 25 mg Oral Q8H PRN   nystatin (MYCOSTATIN) suspension 500,000 Units 5 mL Oral catheter. 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  Nephrology has been consulted for hyponatremia and hyperkalemia     Hyponatremia - hypovolemic  - off IVFS, had been receiving albumin intermittently  - Na level improving to 135 today      Hyperkalemia   - improved

## 2018-05-21 NOTE — PROGRESS NOTES
[facility] Hospitalist Progress Note     CC: Hospital Follow up    PCP: Teresa Thompson MD       Assessment/Plan:     Principal Problem:    Brain metastases [facility])  Active Problems:    Metastatic melanoma ([facility] [address]    Hyperammonemia (HCC)    Transaminitis    Hyperkalemia obstruction  - feels like she needs to urinate, has ramon in place  - ramon flushed with saline without change, then exchanged on 5/14 by RN  - US bladder ordered, showed large amounts of ascites but no urinary retention  - will try reglan as per d/w Dr Kaya Cartagena prior to ca diagnosis  - on BB, decreased to 12.5 BID for BP  - bsl HR 110s per pt     GOC  - patient hospice appropriate but pt is not ready   - d/w patient and family,  - FULL code status     FN:  - IVF: none  - Diet: general     DVT Prophy: SCD, no HSQ normal, CN intact, sensory intact  Psych: Affect- normal  SKIN: warm, dry  EXT: 2+ edema BLE to hips    Data Review:       Labs:     Recent Labs   Lab  05/18/18   0649  05/20/18   0510  05/21/18   0705   RBC  3.69*  3.50*  4.09   HGB  8.0*  7.6*  9.0*   HC

## 2018-05-21 NOTE — PROGRESS NOTES
[facility] AND [facility] Infectious Disease  Progress Note    Philip Better Patient Status:  Inpatient    1980 MRN [id number]   Location [facility] 4W/SW/SE Attending Lawanda Kelsey MD   Hosp Day # 10 PCP Vitor Castro MD     Subjective:  Benjie Pate and Plan:  1.  Profound WBC elevation from steroids and metastatic disease  - Biliary issues also possibly contributing due to widespread metastasis, ascites  - WBCs have been elevated for many months, at 23K today and stable/trending down  - Empiric zosyn

## 2018-05-21 NOTE — CONSULTS
[facility]  Palliative Care Follow Up    Keshawn Trinidad Patient Status:  Inpatient    1980 MRN [id number]   Location [facility] 4W/SW/SE Attending Polina Michele MD   Hosp Day # 8 PCP Karon Dutta MD     Date of Consult: the past and doesn't want to do this again    She still has nausea from time to time however she notes that it is with the pills she requires    She is okay with my adjusting the zofran and reglan regimens    She denies any feelings of anxiety now and is v Flush 0.9 % injection 3 mL, 3 mL, Intravenous, PRN  •  acetaminophen (TYLENOL) tab 650 mg, 650 mg, Oral, Q6H PRN  •  ondansetron HCl (ZOFRAN) injection 4 mg, 4 mg, Intravenous, Q6H PRN  •  dexamethasone (DECADRON) tab 4 mg, 4 mg, Oral, Q8H [facility]  •  allopuri dry.    Palliative Performance Scale : 40%    Palliative Care Goals of Care:  Discussed with Patient and Family: NO - per patient and family's request  Patient's preference about sharing medical information: okay to share with   Patient's decision m

## 2018-05-21 NOTE — PROGRESS NOTES
[facility] AND CLINICS  Progress Note    Spring Swanson Patient Status:  Inpatient    1980 MRN [id number]   Location [facility] 4W/SW/SE Attending Razia Castellanos MD   Hosp Day # 10 PCP Wendy Rowley MD     Subjective:  Feeling better.  Starting PTH elevated secondary to vitamin D deficiency from liver failure  - appreciate endocrine on board      4.  Malignant Ascites  - requiring daily drainage, limiting to 2.5- 3L daily  - will need to have albumin replacement, [facility]

## 2018-05-22 NOTE — PROGRESS NOTES
NEPHROLOGY DAILY PROGRESS NOTE       Jess Palmer Patient Status:  Inpatient    1980 MRN [id number]   Location [facility] 4W/SW/SE Attending Carl Simmons MD   [facility] Day # 6 PCP Weston Andrews MD     Follow up Reason: Hyponatremia     MORENO mg Oral Nightly   valGANciclovir HCl (VALCYTE) tab TABS 900 mg 900 mg Oral BID   diphenhydrAMINE (BENADRYL) cap/tab 25 mg 25 mg Oral Q8H PRN   nystatin (MYCOSTATIN) suspension 500,000 Units 5 mL Oral QID   Normal Saline Flush 0.9 % injection 3 mL 3 mL Intr off IVFS, had been receiving albumin intermittently  - Na level improving to 137 today      Hyperkalemia   - resolved.        Hypophosphatemia   - likely due to poor PO intake   - encourage PO intake  - check phos level and replace PRN     Hypocalcemia   -

## 2018-05-22 NOTE — PHYSICAL THERAPY NOTE
PHYSICAL THERAPY TREATMENT NOTE - INPATIENT     Room Number: 461/461-A       Presenting Problem: Melanoma with mets to the bone,liver with malignant ascites, lung, lymph notes and brain    Problem List  Principal Problem:    Brain metastases ([address]  Active times/day to improve activity tolerance. Pt understands well. Pt is demonstrating good progress in PT towards the stated goals.  Pt continues to stay below the baseline and will continue to benefit from skilled PT while in hospital so pt can maximize fun A Little   -   Climbing 3-5 steps with a railing?: A Lot     AM-PAC Score:  Raw Score: 17   PT Approx Degree of Impairment Score: 50.57%   Standardized Score (AM-PAC Scale): 42.13   CMS Modifier (G-Code): CK    FUNCTIONAL ABILITY STATUS  Gait Assessment

## 2018-05-22 NOTE — DIETARY NOTE
ADULT NUTRITION INITIAL ASSESSMENT    Pt is at high nutrition risk. Pt meets malnutrition criteria.       CRITERIA FOR MALNUTRITION DIAGNOSIS:  Criteria for severe malnutrition diagnosis: chronic illness related to energy intake less than 75% for greater [E72.20]  Brain metastases ([facility] [address] [C79.31]  Transaminitis [R74.0]  Ascites, malignant [R18.0]  Metastatic melanoma ([facility] [address] [C79.9]    PERTINENT PAST MEDICAL HISTORY:   Past Medical History:   Diagnosis Date   • Anxiety 5/15/2018   • Cancer [facility])    • Reactive and HS   • Vancomycin HCl  125 mg Oral Daily   • calciTRIOL  0.25 mcg Oral Daily   • Calcium Carbonate Antacid  500 mg Oral TID   • piperacillin-tazobactam  3.375 g Intravenous Q8H   • diazepam  5 mg Oral BID   • cobimetinib  60 mg Oral Daily   • vemurafen follow up within 5 days    [address], [address] [facility] Dietitian [id number]

## 2018-05-22 NOTE — CONSULTS
[facility] - [facility]  Palliative Care Follow Up    Spring Swanson Patient Status:  Inpatient    1980 MRN [id number]   Location [facility] 4W/SW/SE Attending Zeus Marie MD   [facility] Day # 6 PCP Wendy Rowley MD     Date of Consult: 20 Oral, BID  •  morphINE sulfate (PF) 4 MG/ML injection 2 mg, 2 mg, Intravenous, Q4H PRN  •  guaiFENesin (ROBITUSSIN) 100 MG/5ML solution 100 mg, 100 mg, Oral, Q4H PRN  •  Normal Saline Flush 0.9 % injection 10 mL, 10 mL, Intravenous, PRN  •  Enoxaparin [facility]:        Objective:  Vital Signs:  Blood pressure 116/79, pulse 103, temperature 97.9 °F (36.6 °C), temperature source Oral, resp. rate 20, weight 147 lb 9.6 oz (67 kg), SpO2 96 %, not currently breastfeeding. Body mass index is 26.15 kg/m². Zofran from ODT to 8 mg oral pill Q 6 ATC    Will change Reglan from 5 mg to 10 mg po TID prior to meals and nightly ATC    Stop Norco    Start Roxanol at 10 mg po Q 2 prn for mild to moderate pain - will likely need to titrate dose according to sx's    Ch

## 2018-05-22 NOTE — PROGRESS NOTES
[facility] Hospitalist Progress Note     CC: Hospital Follow up    PCP: Torin Higgins MD       Assessment/Plan:     Principal Problem:    Brain metastases [facility])  Active Problems:    Metastatic melanoma ([facility] [address]    Hyperammonemia (HCC)    Transaminitis    Hyperkalemia obstruction  - feels like she needs to urinate, has ramon in place  - ramon flushed with saline without change, then exchanged on 5/14 by RN  - US bladder ordered, showed large amounts of ascites but no urinary retention  - will try reglan as per d/w Dr Harinder Harris prior to ca diagnosis  - on BB, decreased to 12.5 BID for BP  - bsl HR 110s per pt     GOC  - patient hospice appropriate but pt is not ready   - d/w patient and family,  - FULL code status     FN:  - IVF: none  - Diet: general     DVT Prophy: SCD, no HSQ extremities  Neuro: Grossly normal, CN intact, sensory intact  Psych: Affect- normal  SKIN: warm, dry  EXT: 2+ edema BLE to hips    Data Review:       Labs:     Recent Labs   Lab  05/20/18   0510  05/21/18   0705  05/22/18   0530   RBC  3.50*  4.09  3.97

## 2018-05-22 NOTE — PROGRESS NOTES
[facility] AND [facility] Infectious Disease  Progress Note    Cyndi Mahmood Patient Status:  Inpatient    1980 MRN [id number]   Location [facility] 4W/SW/SE Attending Nahed Clements MD   Hosp Day # 6 PCP Maddison Grier MD     Subjective:  Patient intracranial hemorrhage or midline shift.     Antibiotics Reviewed:  Zosyn     Assessment and Plan:  1.  Profound WBC elevation from steroids and metastatic disease  - Biliary issues also possibly contributing due to widespread metastasis, ascites  - WBCs

## 2018-05-22 NOTE — PROGRESS NOTES
[facility] AND CLINICS  Progress Note    José Luis Dong Patient Status:  Inpatient    1980 MRN [id number]   Location [facility] 4W/SW/SE Attending Lisa Rhoades MD   [facility] Day # 6 PCP Wynell Mortimer, MD     Subjective:  Reports more pain today i discharge      Electrolyte imbalance  Hyperkalemia, hypocalcemia possible  tumor lysis could be a factor.  -appreciate nephrology input   - PTH elevated secondary to vitamin D deficiency from liver failure  - appreciate endocrine on board      Malignant As

## 2018-05-22 NOTE — CM/SW NOTE
5/22-This Writer contacted Cristina Rubi ([phone]) in regards to the [facility] [employer] informed this Writer that they are not able to provide cards via email or online, the have to ship the cards out. This Writer inquired how long orders take.  Gen

## 2018-05-23 NOTE — PROGRESS NOTES
NEPHROLOGY DAILY PROGRESS NOTE       [facility] Patient Status:  Inpatient    1980 MRN [id number]   Location [facility] 4W/SW/SE Attending Zoie Silva MD   Hosp Day # 15 PCP Shanda Chiu MD     Follow up Reason: Hyponatremia     MORENO Calcium Carbonate Antacid (TUMS) chewable tab 500 mg 500 mg Oral TID   Piperacillin Sod-Tazobactam So (ZOSYN) 3.375 g in dextrose 5 % 100 mL ADD-vantage 3.375 g Intravenous Q8H   diazepam (VALIUM) tab 5 mg 5 mg Oral BID   cobimetinib (COTELLIC) tablet TA (L) 05/23/2018   BUN 11 05/23/2018   CREATSERUM 0.44 (L) 05/23/2018   .7 (H) 05/16/2018   WBC 25.0 (H) 05/23/2018   HB Auto Resulted 05/06/2018   HCT 29.2 (L) 05/23/2018    (H) 05/23/2018       ASSESSMENT AND PLAN:   This is a 40year old fem

## 2018-05-23 NOTE — CONSULTS
[facility] [place] [facility] - [facility]  Palliative Care Follow Up    Connor Stanley Patient Status:  Inpatient    1980 MRN [id number]   Location [facility] 4W/SW/SE Attending Jillian Drew MD   [facility] Day # 15 PCP Nirav Traore MD     Date of Consult: 20 breakthrough prn dosing  Also she is willing to try long acting Morphine low dose for abdominal pain and back pain since Norco isn't working now and Morphine IV won't be given at home    No nausea  No anxiety or panic attacks  No emesis  She is able to [facility] MG/5ML solution 100 mg, 100 mg, Oral, Q4H PRN  •  Normal Saline Flush 0.9 % injection 10 mL, 10 mL, Intravenous, PRN  •  Enoxaparin Sodium (LOVENOX) 40 MG/0.4ML injection 40 mg, 40 mg, Subcutaneous, Daily  •  mirtazapine (REMERON) tab 15 mg, 15 mg, Oral, N breastfeeding. Body mass index is 26.15 kg/m². Present Level of pain: none at this time  Non-verbal signs of pain present: NO    Physical Exam:  General: Alert, awake and in no apparent respiratory distress. HEENT: No focal deficits.    Neurologic: Alert today's visit with Dr Ale Napier. I will continue to follow clinically. Donell Jiménez MD, Cecy Montenegro M.D. [id number]  5/23/2018  10:22 AM

## 2018-05-23 NOTE — PROGRESS NOTES
[facility] AND [facility] Infectious Disease  Progress Note    Pippa Maria Patient Status:  Inpatient    1980 MRN [id number]   Location [facility]5W Attending Alona Argueta MD   Hosp Day # 15 PCP Zarina Coleman MD     Subjective:  Patient seen/ex Reviewed:  Zosyn     Assessment and Plan:  1.  Profound WBC elevation from steroids and metastatic disease  - Biliary issues also possibly contributing due to widespread metastasis, ascites  - WBCs have been elevated for many months, at 25K today and stabl

## 2018-05-23 NOTE — PLAN OF CARE
At approx 0915, this RN found pt to be extremely tachycardic when it came time for AM meds. Pt stated \"I can feel my heart racing. It feels like its going to pop out of my chest.\"  on monitor in room and too fast to count upon palpation. BP stable.

## 2018-05-23 NOTE — PROGRESS NOTES
Received patient from 461 for cardizem gtt due to a-fib with RVR. Albumin, sodium phosphate ordered and endorsed by 4th floor RN Bo Marroquin) Upon arrival to 5th floor, pt stated \"I feel much better now. My heart not longer feels like it's racing. \" Patient re

## 2018-05-23 NOTE — PROGRESS NOTES
[facility] Hospitalist Progress Note     CC: Hospital Follow up    PCP: Julita Soriano MD       Assessment/Plan:     Principal Problem:    Brain metastases [facility])  Active Problems:    Metastatic melanoma ([facility] [address]    Hyperammonemia (HCC)    Transaminitis    Hyperkalemia N/V, recurrent ascites  - s/p pleurx catheter on 5/9  - plan for drainage 3-5 times per week, currently draining daily due to high amounts of ascites and increased SOB  - hold lasix  - limit pleurex drainage to 2.5-3L per day, replace albumin PRN    Abdomi prognosis, meets criteria for hospice  - to f/u with onc, RN to provide resources re palliative after dc  - consulted palliative care today for symptom control, may eventually start [address] conversations but patient and family not ready at this point  - Manda Acosta lb (68 kg)  04/23/18 0839 : 160 lb 8 oz (72.8 kg)      Exam   GEN: NAD, chronically ill but appears improved overall  HEENT: EOMI, PERRLA, O2 NC  Neck: Supple, no JVD  Pulm: CTAB, no crackles or wheezes  CV: tachycardic, irreg irreg rhythm, no murmurs   AB diltiazem 20 mg/hr (05/23/18 1022)     diltiazem (CARDIZEM) bolus from bag, DilTIAZem HCl, Morphine Sulfate (Concentrate), morphINE sulfate (PF), Normal Saline Flush, guaiFENesin, Normal Saline Flush, diphenhydrAMINE, Normal Saline Flush, acetaminophen, on

## 2018-05-23 NOTE — PLAN OF CARE
METABOLIC/FLUID AND ELECTROLYTES - ADULT    • Electrolytes maintained within normal limits Not Progressing          CARDIOVASCULAR - ADULT    • Maintains optimal cardiac output and hemodynamic stability Progressing    • Absence of cardiac arrhythmias or at

## 2018-05-23 NOTE — PROGRESS NOTES
Saw patient briefly for tachycardia    She is in AF with RVR and rates to 170s at times. Tele placed [phone]. Cardizem IV bolus given and drip started at 10 and then increased to 20 / hour. Lungs clear, HR tachy. No edema.   In no acute distress    Echo

## 2018-05-23 NOTE — CONSULTS
[facility]    PATIENT'S NAME: Delmi Divers   ATTENDING PHYSICIAN: Jonatan Lopez MD   CONSULTING PHYSICIAN: Tristin Baez.  Cecily Case MD   PATIENT ACCOUNT#:   [id number]    LOCATION:  [address] [place] [address] Road #:   [id number]       DATE OF BIRTH:  [birth year]/ normocephalic. Eyes are nonicteric. Oral mucosa is moist.  NECK:  No JVD or carotid bruits. Thyroid is small. LUNGS:  No rales or wheezes. Diminished breath sounds in the bases. HEART:  Rapid irregular rhythm. ABDOMEN:  Ascites and PleurX drain.

## 2018-05-24 NOTE — PROGRESS NOTES
Josh Velazquez is a 41year old female. Patient presents with:  Dyspnea ABIOLA SOB (respiratory)      HPI:    Some appetite; arrhythmia being addressed    REVIEW OF SYSTEMS:   A comprehensive 11 point review of systems was completed.   Pertinent positives an wheezes. CARDIO: RRR G4/G5, no rubs, clicks, heaves, or murmurs. GI:  Soft NT/ND, BS present, No masses , rebound, no HSM. EXTREMITIES:  No edema, no clubbing, no cyanosis. NEURO:  No focal neurologic deficits. DERM:  Warm, dry, no rashes.   IV Site: o 20 mg/dL   Creatinine 0.87 0.50 - 1.50 mg/dL   Calcium, Total 6.4 (L) 8.5 - 10.5 mg/dL   BUN/CREA Ratio 33.3 (H) 10.0 - 20.0   Anion Gap 16 0 - 18 mmol/L   Calculated Osmolality 266 (L) 275 - 295 mOsm/kg   GFR, Non-African American >60 >=60   GFR, - Clinical correlation and close follow up  is recommended.     Reviewed by Esther Lamb M.D.        -URIC ACID, SERUM   Result Value Ref Range   Uric Acid 7.9 (H) 2.1 - 7.4 mg/dL   -PHOSPHORUS   Result Value Ref Range   Phosphorus 2.3 (L) 2.4 - 4.7 mg/dL >60 >=60   GFR, -American >60 >=60   -ASSAY, THYROID STIM HORMONE   Result Value Ref Range   TSH 4.92 0.45 - 5.33 uIU/mL   -CORTISOL   Result Value Ref Range   Cortisol 13.3 mcg/dL   -OSMOLALITY, URINE   Result Value Ref Range   Osmolality Urine 714 Potassium 5.9 (H) 3.3 - 5.1 mmol/L   Chloride 95 95 - 110 mmol/L   CO2 15 (L) 22 - 32 mmol/L   BUN 25 (H) 8 - 20 mg/dL   Creatinine 0.64 0.50 - 1.50 mg/dL   Calcium, Total 6.0 (LL) 8.5 - 10.5 mg/dL   BUN/CREA Ratio 39.1 (H) 10.0 - 20.0   Anion Gap 15 0 - (H) 10.0 - 20.0   Calculated Osmolality 268 (L) 275 - 295 mOsm/kg   GFR, Non-African American >60 >=60   GFR, -American >60 >=60   -MAGNESIUM   Result Value Ref Range   Magnesium 2.0 1.8 - 2.5 mg/dL   -PHOSPHORUS   Result Value Ref Range   Phosphoru ALT 48 14 - 54 U/L    (H) 15 - 41 U/L   Alkaline Phosphatase 1,734 (H) 32 - 100 U/L   Bilirubin, Total 2.4 (H) 0.3 - 1.2 mg/dL   Total Protein 4.7 (L) 5.9 - 8.4 g/dL   Albumin 1.1 (L) 3.5 - 4.8 g/dL   Globulin 3.6 2.5 - 3.7 g/dL   A/G Ratio 0.3 (L 29.2 (L) 32.0 - 37.0 g/dl   RDW 23.9 (H) 11.0 - 15.0 %    140 - 400 K/UL   MPV 7.7 7.4 - 10.3 fL   -PHOSPHORUS   Result Value Ref Range   Phosphorus 1.8 (L) 2.4 - 4.7 mg/dL   -ALBUMIN SERUM   Result Value Ref Range   Albumin 1.5 (L) 3.5 - 4.8 g/dL BUN/CREA Ratio 35.7 (H) 10.0 - 20.0   Calculated Osmolality 273 (L) 275 - 295 mOsm/kg   GFR, Non-African American >60 >=60   GFR, -American >60 >=60   -VITAMIN D, 25-HYDROXY   Result Value Ref Range   Vitamin D, 25OH, Total 5.5 ng/mL   -RENAL FUNC Creatinine 0.47 (L) 0.50 - 1.50 mg/dL   Calcium, Total 6.4 (L) 8.5 - 10.5 mg/dL   Albumin 2.0 (L) 3.5 - 4.8 g/dL   Phosphorus 1.1 (L) 2.4 - 4.7 mg/dL   BUN/CREA Ratio 29.8 (H) 10.0 - 20.0   Anion Gap 10 0 - 18 mmol/L   Calculated Osmolality 284 275 - 295 -MAGNESIUM   Result Value Ref Range   Magnesium 1.4 (L) 1.8 - 2.5 mg/dL   -FERRITIN   Result Value Ref Range   Ferritin 193 11 - 307 ng/mL   -VITAMIN B12   Result Value Ref Range   Vitamin B12 >1,500 (H) 181 - 919 pg/mL   -FOLIC ACID SERUM(FOLATE)   Resu 31. 7 (H) 10.0 - 20.0   Anion Gap 8 0 - 18 mmol/L   Calculated Osmolality 284 275 - 295 mOsm/kg   GFR, Non-African American >60 >=60   GFR, -American >60 >=60   -MAGNESIUM   Result Value Ref Range   Magnesium 1.8 1.8 - 2.5 mg/dL   -PHOSPHORUS   Resul 1.8 - 7.7 K/UL   Lymphocyte Absolute 1.7 1.0 - 4.0 K/UL   Monocyte Absolute 4.5 (H) 0.0 - 1.0 K/UL   Eosinophil Absolute 0.0 0.0 - 0.7 K/UL   Basophil Absolute 0.0 0.0 - 0.2 K/UL   Metamyelocyte Absolute 0.56 (H) 0 K/UL   Myelocyte Absolute Manual 0.56 (H) Hypochromia 2+ (A)    Stomatocyte 1+    -CBC W/ DIFFERENTIAL   Result Value Ref Range   WBC 44.8 (H) 4.0 - 11.0 K/UL   RBC 4.02 3.70 - 5.40 M/UL   HGB 8.8 (L) 12.0 - 16.0 g/dL   HCT 29.5 (L) 35.0 - 48.0 %   MCV 73.4 (L) 80.0 - 100.0 fL   MCH 21.8 (L) 27. 80.0 - 100.0 fL   MCH 21.8 (L) 27.0 - 32.0 pg   MCHC 29.8 (L) 32.0 - 37.0 g/dl   RDW 23.7 (H) 11.0 - 15.0 %    140 - 400 K/UL   MPV 7.5 7.4 - 10.3 fL   Neutrophil % 91 %   Lymphocyte % 3 %   Monocyte % 1 %   Eosinophil % 0 %   Basophil % 0 %   Band Lymphocyte Absolute 3.8 1.0 - 4.0 K/UL   Monocyte Absolute 1.0 0.0 - 1.0 K/UL   Eosinophil Absolute 0.0 0.0 - 0.7 K/UL   Basophil Absolute 0.2 0.0 - 0.2 K/UL   -CBC W/ DIFFERENTIAL   Result Value Ref Range   WBC 27.9 (H) 4.0 - 11.0 K/UL   RBC 4.09 3.70 - Lymphocyte Absolute 2.4 1.0 - 4.0 K/UL   Monocyte Absolute 1.0 0.0 - 1.0 K/UL   Eosinophil Absolute 0.0 0.0 - 0.7 K/UL   Basophil Absolute 0.1 0.0 - 0.2 K/UL   -CBC W/ DIFFERENTIAL   Result Value Ref Range   WBC 22.5 (H) 4.0 - 11.0 K/UL   RBC 3.85 3.70 -

## 2018-05-24 NOTE — PROGRESS NOTES
NEPHROLOGY DAILY PROGRESS NOTE       Marcia Nurse Patient Status:  Inpatient    1980 MRN [id number]   Location [facility] 4W/SW/SE Attending Zhao Woodard MD   Hosp Day # 15 PCP Virgil Hercules MD     Follow up Reason: Hyponatremia     MORENO Piperacillin Sod-Tazobactam So (ZOSYN) 3.375 g in dextrose 5 % 100 mL ADD-vantage 3.375 g Intravenous Q8H   cobimetinib (COTELLIC) tablet TABS 60 mg 60 mg Oral Daily   vemurafenib (ZELBORAF) tablet TABS 960 mg 960 mg Oral BID   guaiFENesin (ROBITUSSIN) 1 05/24/2018    (H) 05/24/2018       ASSESSMENT AND PLAN:   This is a 40year old female with metastatic melanoma with recurrent ascites s/p pleurx catheter. 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  Nephrology has been consulted for hyponatremia and hyperkalemia

## 2018-05-24 NOTE — CONSULTS
[facility]  Palliative Care Follow Up    Josh Velazquez Patient Status:  Inpatient    1980 MRN [id number]   Location [address] [facility] Attending Kenna Linda MD   Hosp Day # 15 PCP Teresa Thompson MD     Date of Consult: 2018  Earlene Campbell Comment:Sneezing, itchy throat, congestion    Medications:     Current Facility-Administered Medications:   •  sodium phosphate 30 mEq in sodium chloride 0.9 % 250 mL IVPB, 30 mEq, Intravenous, Once  •  DilTIAZem HCl (CARDIZEM) tab 60 mg, 60 mg, Oral, 4 ti 0.9 % injection 3 mL, 3 mL, Intravenous, PRN  •  acetaminophen (TYLENOL) tab 650 mg, 650 mg, Oral, Q6H PRN  •  ondansetron HCl (ZOFRAN) injection 4 mg, 4 mg, Intravenous, Q6H PRN  •  dexamethasone (DECADRON) tab 4 mg, 4 mg, Oral, Q8H [facility]  •  allopurinol (Z decision making preferences: self  Code status: FULL CODE STATUS  Have advanced directives been discussed with patient or healthcare power of : Yes  Advance Directive: None              Pre-existing DNR/DNI Order: No  Describe Patient Wishes: FULL

## 2018-05-24 NOTE — PROGRESS NOTES
[facility] AND CLINICS  Progress Note    Tania Melissa Patient Status:  Inpatient    1980 MRN [id number]   Location [facility] 4W/SW/SE Attending Xuan Hidalgo MD   Hosp Day # 15 PCP Asad Kelsey MD     Subjective:  Now on 5th floor floor- w - iron levels nroma- anemia of chronic disease       Electrolyte imbalance  Hyperkalemia, hypocalcemia possible  tumor lysis could be a factor.  -appreciate nephrology input   - PTH elevated secondary to vitamin D deficiency from liver failure  - appreci

## 2018-05-24 NOTE — PROGRESS NOTES
[facility] Hospitalist Progress Note     CC: Hospital Follow up    PCP: Teresa Thompson MD       Assessment/Plan:     Principal Problem:    Brain metastases [facility])  Active Problems:    Metastatic melanoma ([facility] [address]    Hyperammonemia (HCC)    Transaminitis    Hyperkalemia IVF  - IVF stopped 5/16, repeat sodium pending    SOB, N/V, recurrent ascites  - s/p pleurx catheter on 5/9  - plan for drainage 3-5 times per week, currently draining daily due to high amounts of ascites and increased SOB  - hold lasix  - limit pleurex dr family declined hospice on last admission  - had been seen at [facility], poor prognosis, meets criteria for hospice  - to f/u with onc, RN to provide resources re palliative after dc  - consulted palliative care today for symptom control, may eventually start 9.6 oz (67 kg)  05/09/18 1422 : 158 lb 1.1 oz (71.7 kg)  05/06/18 2332 : 158 lb (71.7 kg)  05/06/18 1823 : 150 lb (68 kg)  04/23/18 0839 : 160 lb 8 oz (72.8 kg)      Exam   GEN: NAD, chronically ill but appears improved overall  HEENT: EOMI, PERRLA, O2 NC • enoxaparin  40 mg Subcutaneous Daily   • mirtazapine  15 mg Oral Nightly   • valGANciclovir HCl  900 mg Oral BID   • nystatin  5 mL Oral QID   • dexamethasone  4 mg Oral Q8H [address]   • allopurinol  300 mg Oral Daily     • diltiazem 15 mg/hr (05/24/18 1200

## 2018-05-24 NOTE — PLAN OF CARE
Problem: Patient/Family Goals  Goal: Patient/Family Long Term Goal  Patient's Long Term Goal: To go home.     Interventions:  - Medications and tests as ordered, participating in care plan formation.  - See additional Care Plan goals for specific interventi assist with strengthening/mobility  - Encourage toileting schedule   Outcome: Progressing  PT on consult to work with patient.      Problem: DISCHARGE PLANNING  Goal: Discharge to home or other facility with appropriate resources  INTERVENTIONS:  - Identify Outcome: Progressing      Problem: METABOLIC/FLUID AND ELECTROLYTES - ADULT  Goal: Electrolytes maintained within normal limits  INTERVENTIONS:  - Monitor labs and rhythm and assess patient for signs and symptoms of electrolyte imbalances  - Administer e stability  INTERVENTIONS:  - Monitor vital signs, rhythm, and trends  - Monitor for bleeding, hypotension and signs of decreased cardiac output  - Evaluate effectiveness of vasoactive medications to optimize hemodynamic stability  - Monitor arterial and/or

## 2018-05-24 NOTE — PROGRESS NOTES
[facility] Cardiology/[facility]  Progress Note  ASSESSMENT:    1. Atrial fibrillation with rapid ventricular response.   2.       Metastatic melanoma with new brain lesions.     PLAN:    -        Beta blockers for rate control and calcium channel bl No JVD, carotids no bruits. Cardiac: irregular rate and rhythm, S1, S2 normal, no murmur, rub or gallop. Lungs: Clear without wheezes, rales, rhonchi. Abdomen: Soft, non-tender.  distended drain in place  Extremities: Without clubbing, cyanosis or mac injection 2 mg 2 mg Intravenous Q2H PRN   Vancomycin HCl (FIRVANQ) 50 MG/ML oral solution 125 mg 125 mg Oral Daily   Normal Saline Flush 0.9 % injection 10 mL 10 mL Intravenous PRN   calciTRIOL (ROCALTROL) cap 0.25 mcg 0.25 mcg Oral Daily   Calcium Carbona

## 2018-05-25 NOTE — CM/SW NOTE
Progression of care - Pleurx cath in, PO abx and PO Valcyte at discharge, coupon card pending. Not ready for hospice, return home w/ family when stable, has home O2.   Otis Pinto RN, CTL

## 2018-05-25 NOTE — PLAN OF CARE
Problem: Patient Centered Care  Goal: Patient preferences are identified and integrated in the patient's plan of care  Interventions:  - What would you like us to know as we care for you? I want to do everything possible to get better.   - Provide timely, c FALL  Goal: Free from fall injury  INTERVENTIONS:  - Assess pt frequently for physical needs  - Identify cognitive and physical deficits and behaviors that affect risk of falls.   - [employer] fall precautions as indicated by assessment.  - Educate pt/family Progressing    Goal: Maintains or returns to baseline bowel function  INTERVENTIONS:  - Assess bowel function  - Maintain adequate hydration with IV or PO as ordered and tolerated  - Evaluate effectiveness of GI medications  - Encourage mobilization and ac participate in ADLs to maximize function  - Promote sitting position while performing ADLs such as feeding, grooming, and bathing  - Educate and encourage patient/family in tolerated functional activity level and precautions during self-care    Outcome: Pr Monitor for signs/symptoms of CO2 retention   Outcome: Progressing

## 2018-05-25 NOTE — PROGRESS NOTES
[facility] Hospitalist Progress Note     CC: Hospital Follow up    PCP: Julita Soriano MD       Assessment/Plan:     Principal Problem:    Brain metastases [facility])  Active Problems:    Metastatic melanoma ([facility] [address]    Hyperammonemia (HCC)    Transaminitis    Hyperkalemia hypovolemic due to poor PO intake  - improved with IVF  - IVF stopped 5/16, repeat sodium pending    SOB, N/V, recurrent ascites  - s/p pleurx catheter on 5/9  - plan for drainage 3-5 times per week, currently draining daily due to high amounts of ascites 9.0    Metastatic melanoma  - sees [facility]  - family declined hospice on last admission  - [facility], meets criteria for hospice  - to f/u with onc, RN to provide resources re palliative after dc  - consulted palliative (62.5 kg)  05/24/18 0648 : 136 lb 1.6 oz (61.7 kg)  05/12/18 1200 : 147 lb 9.6 oz (67 kg)  05/09/18 1422 : 158 lb 1.1 oz (71.7 kg)  05/06/18 2332 : 158 lb (71.7 kg)  05/06/18 1823 : 150 lb (68 kg)  04/23/18 0839 : 160 lb 8 oz (72.8 kg)      Exam   GEN: NAD enoxaparin  40 mg Subcutaneous Daily   • valGANciclovir HCl  900 mg Oral BID   • nystatin  5 mL Oral QID   • dexamethasone  4 mg Oral Q8H [address]   • allopurinol  300 mg Oral Daily     • diltiazem 15 mg/hr (05/25/18 0957)     Ondansetron HCl, diazepam, DilTIAZ

## 2018-05-25 NOTE — PROGRESS NOTES
[facility] Cardiology/[facility]  Progress Note  ASSESSMENT:    1. Atrial fibrillation with rapid ventricular response.   2.       Metastatic melanoma with new brain lesions.     PLAN:    -        Beta blockers for rate control and calcium channel bl distress. HEENT: No focal deficits. Neck: No JVD, carotids no bruits. Cardiac: irregular rate and rhythm, S1, S2 normal, no murmur, rub or gallop. Lungs: Clear without wheezes, rales, rhonchi. Abdomen: Soft, non-tender.  distended drain in place asc solution 125 mg 125 mg Oral Daily   Normal Saline Flush 0.9 % injection 10 mL 10 mL Intravenous PRN   calciTRIOL (ROCALTROL) cap 0.25 mcg 0.25 mcg Oral Daily   Calcium Carbonate Antacid (TUMS) chewable tab 500 mg 500 mg Oral TID   Piperacillin Sod-Tazobact

## 2018-05-25 NOTE — PROGRESS NOTES
NEPHROLOGY DAILY PROGRESS NOTE       Tomvenessa Dubin Patient Status:  Inpatient    1980 MRN [id number]   Location [facility] 4W/SW/SE Attending Bruce Davis MD   Hosp Day # 15 PCP Tracey Tamez MD     Follow up Reason: Hyponatremia     MORENO Antacid (TUMS) chewable tab 500 mg 500 mg Oral TID   Piperacillin Sod-Tazobactam So (ZOSYN) 3.375 g in dextrose 5 % 100 mL ADD-vantage 3.375 g Intravenous Q8H   cobimetinib (COTELLIC) tablet TABS 60 mg 60 mg Oral Daily   vemurafenib (ZELBORAF) tablet TABS 05/25/2018   HB Auto Resulted 05/06/2018   HCT 30.0 (L) 05/25/2018    (H) 05/25/2018       ASSESSMENT AND PLAN:   This is a 40year old female with metastatic melanoma with recurrent ascites s/p pleurx catheter. 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  Nephrol

## 2018-05-25 NOTE — PROGRESS NOTES
[facility] AND [facility] Infectious Disease Progress Note    Rito Dejesus Patient Status:  Inpatient    1980 MRN [id number]   Location [facility] 4W/SW/SE Attending Neno Kwan MD   Hosp Day # 15 PCP Mariana Cardona MD     Subjective:  Pt Intravenous, PRN  •  Enoxaparin Sodium (LOVENOX) 40 MG/0.4ML injection 40 mg, 40 mg, Subcutaneous, Daily  •  valGANciclovir HCl (VALCYTE) tab TABS 900 mg, 900 mg, Oral, BID  •  diphenhydrAMINE (BENADRYL) cap/tab 25 mg, 25 mg, Oral, Q8H PRN  •  nystatin (MY MG 2.0 05/25/2018         Assessment/Plan:    1.   Leukocytosis  -secondary to steroids, metastatic CA and infection  -biliary issues with ascites  -WBC has been chronically elevated  -blood and urine cultures NG  -CXR with pulmonary mets  -on IV zosyn d#

## 2018-05-25 NOTE — PHYSICAL THERAPY NOTE
PHYSICAL THERAPY TREATMENT NOTE - INPATIENT     Room Number: 270/457-T       Presenting Problem: Melanoma with mets to the bone,liver with malignant ascites, lung, lymph notes and brain    Problem List  Principal Problem:    Brain metastases ([address]  Active RESTRICTION  Weight Bearing Restriction: None                PAIN ASSESSMENT   Ratin  Location: No pain  Management Techniques: Activity promotion; Body mechanics;Repositioning    BALANCE Current Status  NT    Goal #2 Patient is able to demonstrate transfers Sit to/from Stand at assistance level: minimum assistance with walker - rolling      Goal #2  Current Status  Min assist with RW   Goal #3 Patient is able to ambulate 25 feet with ass

## 2018-05-25 NOTE — CONSULTS
[facility]  Palliative Care Follow Up    Greer Nissen Patient Status:  Inpatient    1980 MRN [id number]   Location [facility]5W Attending Lawrence Villa MD   Hosp Day # 15 PCP Rudy Kurtz MD     Date of Consult: 20 time however she keeps requiring MS IV prn since pain escalates  She is willing to try MS CR TID ATC    She frequently misses her Zofran doses at [address] [facility] and prefers prn for now    She is still on the cardiac unit thus unclear about d/c planning  Will print pr Daily  •  vemurafenib (ZELBORAF) tablet TABS 960 mg, 960 mg, Oral, BID  •  guaiFENesin (ROBITUSSIN) 100 MG/5ML solution 100 mg, 100 mg, Oral, Q4H PRN  •  Normal Saline Flush 0.9 % injection 10 mL, 10 mL, Intravenous, PRN  •  Enoxaparin Sodium (LOVENOX) 40 24.41 kg/m². Present Level of pain: none at this time appears very comfortable  Non-verbal signs of pain present: NO    Physical Exam:  General: Alert, awake and in no apparent respiratory distress. HEENT: No focal deficits.    Neurologic: Alert and orien place in medical chart in case patient is d/c'd to home over the weekend    Discussed today's visit with Dr Marilu Spence. I will continue to follow clinically. Donell Ruiz MD, Kaleigh Clemons M.D. [id number]  5/25/2018  12:20 PM

## 2018-05-26 NOTE — PROGRESS NOTES
[facility] Hospitalist Progress Note     CC: Hospital Follow up    PCP: Rudy Kurtz MD       Assessment/Plan:     Principal Problem:    Brain metastases [facility])  Active Problems:    Metastatic melanoma ([facility] [address]    Hyperammonemia (HCC)    Transaminitis    Hyperkalemia 6.3, LDH elevated, low Ca  - s/p kayexalate, insulin/glucose, calcium gluconate  - started empiric allopurinol (continued), gentle IVF (now stopped)   - renal consult, low K diet    Hyponatremia- improved  - hypovolemic due to poor PO intake  - improved wi Arnaldo. Still working with SW to see if covered as outpt as co-pay is $1500 per month    Anemia  -chronic, 2/2 chemo/cancer  -in the 8's recently but down to 7.5, transfused 1 unit for symptomatic anemia on 5/20.   Up to 9.1    Metastatic melanoma  - sees 1900 ml   Net            -1480 ml       Last 3 Weights  05/25/18 0552 : 137 lb 12.8 oz (62.5 kg)  05/24/18 0648 : 136 lb 1.6 oz (61.7 kg)  05/12/18 1200 : 147 lb 9.6 oz (67 kg)  05/09/18 1422 : 158 lb 1.1 oz (71.7 kg)  05/06/18 2332 : 158 lb (71.7 kg)  05 Calcium Carbonate Antacid  500 mg Oral TID   • cobimetinib  60 mg Oral Daily   • vemurafenib  960 mg Oral BID   • enoxaparin  40 mg Subcutaneous Daily   • valGANciclovir HCl  900 mg Oral BID   • nystatin  5 mL Oral QID   • dexamethasone  4 mg Oral [facility]

## 2018-05-26 NOTE — PROGRESS NOTES
[facility]    Progress Note    Philip Better Patient Status:  Inpatient    1980 MRN [id number]   Location [facility]5W Attending Lawanda Kelsey MD   Hosp Day # 13 PCP Vitor Castro MD       SUBJECTIVE:    Awake alert tyler 2.5-20 mg/hr Intravenous Continuous   DilTIAZem HCl (CARDIZEM) tab 60 mg 60 mg Oral PRN   metoprolol Tartrate (LOPRESSOR) tab 25 mg 25 mg Oral BID   Morphine Sulfate (Concentrate) concentrated solution 15 mg 15 mg Oral Q2H PRN   morphINE sulfate (PF) 4 MG/ The patient with electrolyte disturbances which are resolved. Hypophosphatemia currently on phosphate rider. Volume depletion and borderline blood pressure which are improved and stable.     Plan:    Continue same management, recheck follow-up lab

## 2018-05-26 NOTE — PROGRESS NOTES
[facility] Cardiology/[facility]  Progress Note  ASSESSMENT:    1. Atrial fibrillation with rapid ventricular response.   2.       Metastatic melanoma with new brain lesions.     PLAN:    -        Beta blockers for rate control and calcium channel bl breastfeeding. General: Alert and oriented in no apparent distress. HEENT: No focal deficits. Neck: No JVD, carotids no bruits. Cardiac: irregular rate and rhythm, S1, S2 normal, no murmur, rub or gallop. Lungs: Clear without wheezes, rales, rhonchi. [facility]) 50 MG/ML oral solution 125 mg 125 mg Oral Daily   Normal Saline Flush 0.9 % injection 10 mL 10 mL Intravenous PRN   calciTRIOL (ROCALTROL) cap 0.25 mcg 0.25 mcg Oral Daily   Calcium Carbonate Antacid (TUMS) chewable tab 500 mg 500 mg Oral TID   P

## 2018-05-26 NOTE — PROGRESS NOTES
[facility] AND [facility] Infectious Disease Progress Note    Philip Better Patient Status:  Inpatient    1980 MRN [id number]   Location [facility] 4W/SW/SE Attending Lawanda Kelsey MD   Hosp Day # 13 PCP Vitor Castro MD     Subjective:  Pt Enoxaparin Sodium (LOVENOX) 40 MG/0.4ML injection 40 mg, 40 mg, Subcutaneous, Daily  •  valGANciclovir HCl (VALCYTE) tab TABS 900 mg, 900 mg, Oral, BID  •  diphenhydrAMINE (BENADRYL) cap/tab 25 mg, 25 mg, Oral, Q8H PRN  •  nystatin (MYCOSTATIN) suspension and infection  -biliary issues with ascites  -WBC has been chronically elevated  -blood and urine cultures NG  -CXR with pulmonary mets  -on IV zosyn d#14/14, will d/c and start PO augmentin  2.   CMV IgM and IgG positive  -on valcyte 900mg BID d#13/21  -CM

## 2018-05-27 NOTE — PROGRESS NOTES
[facility] AND CLINICS  Progress Note    Kenna Jerez Patient Status:  Inpatient    1980 MRN [id number]   Location [facility] 4W/SW/SE Attending Cooper Ndiaye MD   Hosp Day # 12 PCP Aleksandra Chowdary MD     Subjective:  Feels better, walking viola Anemia   - hgb of 8.9   - workup negative  - secondary to anemia of chronic disease    Electrolyte imbalance  Hyperkalemia, hypocalcemia possible  tumor lysis could be a factor.  -appreciate nephrology input   - PTH elevated secondary to vitamin D defi

## 2018-05-27 NOTE — PROGRESS NOTES
[facility] Hospitalist Progress Note     CC: Hospital Follow up    PCP: Virgil Hercules MD       Assessment/Plan:     Principal Problem:    Brain metastases [facility])  Active Problems:    Metastatic melanoma ([facility] [address]    Hyperammonemia (HCC)    Transaminitis    Hyperkalemia tumor lysis labs, on 5/12 Uric acid 8, K 6.3, LDH elevated, low Ca  - s/p kayexalate, insulin/glucose, calcium gluconate  - started empiric allopurinol (continued), gentle IVF (now stopped)   - renal consult, low K diet    Hyponatremia- improved  - hypovol +CMV IgM, started on valcyte, d/w Dr Marion Currie. Still working with SW to see if covered as outpt as co-pay is $1500 per month    Anemia  -chronic, 2/2 chemo/cancer  -in the 8's recently but down to 7.5, transfused 1 unit for symptomatic anemia on 5/20.   Up Output              850 ml   Net             -120 ml       Last 3 Weights  05/27/18 0500 : 135 lb 12.8 oz (61.6 kg)  05/25/18 0552 : 137 lb 12.8 oz (62.5 kg)  05/24/18 0648 : 136 lb 1.6 oz (61.7 kg)  05/12/18 1200 : 147 lb 9.6 oz (67 kg)  05/09/18 1422 : Daily   • calciTRIOL  0.25 mcg Oral Daily   • Calcium Carbonate Antacid  500 mg Oral TID   • cobimetinib  60 mg Oral Daily   • vemurafenib  960 mg Oral BID   • enoxaparin  40 mg Subcutaneous Daily   • valGANciclovir HCl  900 mg Oral BID   • nystatin  5 mL

## 2018-05-27 NOTE — PROGRESS NOTES
[facility] Cardiology/[facility]  Progress Note  ASSESSMENT:    1. Atrial fibrillation with rapid ventricular response.   2.       Metastatic melanoma with new brain lesions.     PLAN:    -        Beta blockers for rate control and calcium channel bl Appropriate      Laboratory/Data:  Diagnostics:   EKG:   Echo: 5/23/18  -------------------------------------------------------------------  Study Conclusions  1. Left ventricle:  The cavity size was normal. Systolic function     was normal. The estimated e 0.25 mcg 0.25 mcg Oral Daily   Calcium Carbonate Antacid (TUMS) chewable tab 500 mg 500 mg Oral TID   cobimetinib (COTELLIC) tablet TABS 60 mg 60 mg Oral Daily   vemurafenib (ZELBORAF) tablet TABS 960 mg 960 mg Oral BID   guaiFENesin (ROBITUSSIN) 100 MG/5M

## 2018-05-27 NOTE — PROGRESS NOTES
[facility] AND [facility] Infectious Disease Progress Note    Brenna Mejía Patient Status:  Inpatient    1980 MRN [id number]   Location [facility] 3W/SW Attending Jaida Hernandez MD   Hosp Day # 12 PCP Sujey Cuenca MD     Subjective:  Patient seen mg, Oral, Q4H PRN  •  Normal Saline Flush 0.9 % injection 10 mL, 10 mL, Intravenous, PRN  •  Enoxaparin Sodium (LOVENOX) 40 MG/0.4ML injection 40 mg, 40 mg, Subcutaneous, Daily  •  valGANciclovir HCl (VALCYTE) tab TABS 900 mg, 900 mg, Oral, BID  •  diphenh 05/27/2018    05/27/2018   CREATSERUM 0.36 05/27/2018   BUN 14 05/27/2018    05/27/2018   K 4.3 05/27/2018    05/27/2018   CO2 29 05/27/2018    05/27/2018   CA 7.2 05/27/2018   ALB 1.9 05/27/2018   PHOS 2.3 05/27/2018       Radiol

## 2018-05-28 NOTE — PROGRESS NOTES
[facility] Hospitalist Progress Note     CC: Hospital Follow up    PCP: Micheal Jordan MD       Assessment/Plan:     Principal Problem:    Brain metastases [facility])  Active Problems:    Metastatic melanoma ([facility] [address]    Hyperammonemia (HCC)    Transaminitis    Hyperkalemia improved    New brain mets  - Onc/NS consulted  - started on Decadron, will continue  - consider MRI brain when electrolytes stable, could be done as outpatient per onc  - will likely need radiation therapy as outpatient per onc    Lower ext swelling  - ch def, <10  - endocrine consult, appreciate recs    Leukocytosis  - has chronic WBC elevation, anemia w/ onc notes concerning for possible BM involvement  - her WBC is up from prior bsl of 30s- ddx includes BM involvement, reactive, other  - fluid studies no breastfeeding.     Temp:  [97.1 °F (36.2 °C)-98 °F (36.7 °C)] 97.1 °F (36.2 °C)  Pulse:  [] 91  Resp:  [16-20] 20  BP: (102-121)/(72-89) 102/72      Intake/Output:    Intake/Output Summary (Last 24 hours) at 05/28/18 0920  Last data filed at 05/28/18 Amoxicillin-Pot Clavulanate  500 mg Oral Q24H   • digoxin  125 mcg Oral Daily   • metoprolol Tartrate  25 mg Oral TID Beta Blocker/Cardiac   • morphINE Sulfate ER  15 mg Oral Q8H [facility]   • diazepam  7 mg Oral BID   • mirtazapine  30 mg Oral Nightly   • Vanco

## 2018-05-28 NOTE — PROGRESS NOTES
[facility] AND [facility] Infectious Disease Progress Note    Agustina Mathis Patient Status:  Inpatient    1980 MRN [id number]   Location [facility] 3W/SW Attending Marla Pinedo MD   Hosp Day # 16 PCP Torin Higgins MD     Subjective:  Patient seen tablet TABS 960 mg, 960 mg, Oral, BID  •  guaiFENesin (ROBITUSSIN) 100 MG/5ML solution 100 mg, 100 mg, Oral, Q4H PRN  •  Normal Saline Flush 0.9 % injection 10 mL, 10 mL, Intravenous, PRN  •  Enoxaparin Sodium (LOVENOX) 40 MG/0.4ML injection 40 mg, 40 mg, deficit.     Labs:    Lab Results  Component Value Date   WBC 26.2 05/28/2018   HGB 9.3 05/28/2018   HCT 31.4 05/28/2018    05/28/2018   CREATSERUM 0.44 05/28/2018   BUN 14 05/28/2018    05/28/2018   K 4.2 05/28/2018    05/28/2018   CO2 2

## 2018-05-28 NOTE — PHYSICAL THERAPY NOTE
PHYSICAL THERAPY TREATMENT NOTE - INPATIENT     Room Number: 147/408-R       Presenting Problem: Melanoma with mets to the bone,liver with malignant ascites, lung, lymph notes and brain    Problem List  Principal Problem:    Brain metastases ([address]  Active HHPT evaluation, especially since she indicates that her  sometimes carries her up the stairs. She needing a little encouraging today but did well once she got started.     DISCHARGE RECOMMENDATIONS  PT Discharge Recommendations: 24 hour care/superv STATUS  Gait Assessment   Gait Assistance:  Moderate assistance  Distance (ft): 50 feet, 45 feet  Assistive Device:  (Bilateral hand-held assist)  Pattern: R Steppage;L Steppage;R Foot flat;L Foot flat  Stoop/Curb Assistance: Not tested         Patient End

## 2018-05-28 NOTE — PLAN OF CARE
Problem: Patient/Family Goals  Goal: Patient/Family Long Term Goal  Patient's Long Term Goal: To go home.     Interventions:  - Medications and tests as ordered, participating in care plan formation.  - See additional Care Plan goals for specific interventi GASTROINTESTINAL - ADULT  Goal: Minimal or absence of nausea and vomiting  INTERVENTIONS:  - Maintain adequate hydration with IV or PO as ordered and tolerated  - Nasogastric tube to low intermittent suction as ordered  - Evaluate effectiveness of ordered

## 2018-05-28 NOTE — PLAN OF CARE
Problem: Patient/Family Goals  Goal: Patient/Family Long Term Goal  Patient's Long Term Goal: To go home.     Interventions:  - Medications and tests as ordered, participating in care plan formation.  - See additional Care Plan goals for specific interventi Provide assistive devices as appropriate  - Consider OT/PT consult to assist with strengthening/mobility  - Encourage toileting schedule   Outcome: Progressing      Problem: DISCHARGE PLANNING  Goal: Discharge to home or other facility with appropriate res Outcome: Progressing      Problem: METABOLIC/FLUID AND ELECTROLYTES - ADULT  Goal: Electrolytes maintained within normal limits  INTERVENTIONS:  - Monitor labs and rhythm and assess patient for signs and symptoms of electrolyte imbalances  - Administer e signs, rhythm, and trends  - Monitor for bleeding, hypotension and signs of decreased cardiac output  - Evaluate effectiveness of vasoactive medications to optimize hemodynamic stability  - Monitor arterial and/or venous puncture sites for bleeding and/or

## 2018-05-28 NOTE — PROGRESS NOTES
NEPHROLOGY DAILY PROGRESS NOTE       Rg Quocmayte Patient Status:  Inpatient    1980 MRN [id number]   Location [facility] 4W/SW/SE Attending Esthela Almendarez MD   Hosp Day # 16 PCP Micheal Jordan MD     Follow up Reason: Hyponatremia     MORENO calciTRIOL (ROCALTROL) cap 0.25 mcg 0.25 mcg Oral Daily   Calcium Carbonate Antacid (TUMS) chewable tab 500 mg 500 mg Oral TID   cobimetinib (COTELLIC) tablet TABS 60 mg 60 mg Oral Daily   vemurafenib (ZELBORAF) tablet TABS 960 mg 960 mg Oral BID   Nhi with metastatic melanoma with recurrent ascites s/p pleurx catheter. 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  Nephrology has been consulted for hyponatremia and hyperkalemia.     Hyponatremia -  - IVFs stopped, has been receiving albumin intermittently  - Na levels s

## 2018-05-28 NOTE — PROGRESS NOTES
[facility] Cardiology/[facility]  Progress Note  ASSESSMENT:    1. Atrial fibrillation with rapid ventricular response.   2.       Metastatic melanoma with new brain lesions.     PLAN:    -        Beta blockers for rate control and calcium channel bl 5/23/18  -------------------------------------------------------------------  Study Conclusions  1. Left ventricle: The cavity size was normal. Systolic function     was normal. The estimated ejection fraction was 55-60%.  Wall     motion was normal; there Carbonate Antacid (TUMS) chewable tab 500 mg 500 mg Oral TID   cobimetinib (COTELLIC) tablet TABS 60 mg 60 mg Oral Daily   vemurafenib (ZELBORAF) tablet TABS 960 mg 960 mg Oral BID   guaiFENesin (ROBITUSSIN) 100 MG/5ML solution 100 mg 100 mg Oral Q4H PRN

## 2018-05-29 NOTE — PLAN OF CARE
Problem: Patient/Family Goals  Goal: Patient/Family Long Term Goal  Patient's Long Term Goal: To go home.     Interventions:  - Medications and tests as ordered, participating in care plan formation.  - See additional Care Plan goals for specific interventi PLANNING  Goal: Discharge to home or other facility with appropriate resources  INTERVENTIONS:  - Identify barriers to discharge w/pt and caregiver  - Include patient/family/discharge partner in discharge planning  - Arrange for needed discharge resources QID PRN for diarrhea. She states that it has helped with diarrhea.      Problem: METABOLIC/FLUID AND ELECTROLYTES - ADULT  Goal: Electrolytes maintained within normal limits  INTERVENTIONS:  - Monitor labs and rhythm and assess patient for signs and symptom stability  INTERVENTIONS:  - Monitor vital signs, rhythm, and trends  - Monitor for bleeding, hypotension and signs of decreased cardiac output  - Evaluate effectiveness of vasoactive medications to optimize hemodynamic stability  - Monitor arterial and/or

## 2018-05-29 NOTE — DIETARY NOTE
ADULT NUTRITION REASSESSMENT     Pt is at high nutrition risk. Pt meets malnutrition criteria.       CRITERIA FOR MALNUTRITION DIAGNOSIS:  Criteria for severe malnutrition diagnosis: chronic illness related to energy intake less than 75% for greater than frequent meals due to early satiety with ascites.     reinforced/encouraged  - Coordination of nutrition care: collaboration with other providers  - Discharge and transfer of nutrition care to new setting or provider: monitor plans    ADMITTING DIAGNOSIS: oz)    GASTROINTESTINAL: early satiety and diarrhea negative C-diff.   Patient refusing Abx-feels better    FOOD/NUTRITION RELATED HISTORY:  Appetite: Fair to good  Intake: mostly 50%  Intake Meeting Needs: marginal, supplements to maximize  Food Allergies:

## 2018-05-29 NOTE — PLAN OF CARE
Problem: Patient Centered Care  Goal: Patient preferences are identified and integrated in the patient's plan of care  Interventions:  - What would you like us to know as we care for you? I want to do everything possible to get better.   - Provide timely, c FALL  Goal: Free from fall injury  INTERVENTIONS:  - Assess pt frequently for physical needs  - Identify cognitive and physical deficits and behaviors that affect risk of falls.   - Sod fall precautions as indicated by assessment.  - Educate pt/family Progressing    Goal: Maintains or returns to baseline bowel function  INTERVENTIONS:  - Assess bowel function  - Maintain adequate hydration with IV or PO as ordered and tolerated  - Evaluate effectiveness of GI medications  - Encourage mobilization and ac participate in ADLs to maximize function  - Promote sitting position while performing ADLs such as feeding, grooming, and bathing  - Educate and encourage patient/family in tolerated functional activity level and precautions during self-care     Outcome: P Monitor for signs/symptoms of CO2 retention   Outcome: Progressing

## 2018-05-29 NOTE — PHYSICAL THERAPY NOTE
Attempted treatment pt not feeling well and declined therapy today. Will follow up tomorrow if appropriate.

## 2018-05-29 NOTE — CONSULTS
[facility]  Palliative Care Follow Up    Brenna Mejía Patient Status:  Inpatient    1980 MRN [id number]   [facility] 3W/SW Attending Chantale Ordonez,    Hosp Day # 25 PCP Sujey Cuenca MD     Date of Consult:  hr tab 100 mg, 100 mg, Oral, Daily Beta Blocker  •  metoprolol Tartrate (LOPRESSOR) tab 25 mg, 25 mg, Oral, Q6H PRN  •  Amoxicillin-Pot Clavulanate (AUGMENTIN) 500-125 MG tab 500 mg, 500 mg, Oral, Q24H  •  digoxin (LANOXIN) tab 125 mcg, 125 mcg, Oral, Jaiden 100 MG/5ML Oral Solution Take 0.8 mL (16 mg total) by mouth every 2 (two) hours as needed for Pain (for breakthrough pain). morphINE Sulfate ER 15 MG Oral Tab CR Take 1 tablet (15 mg total) by mouth every 8 (eight) hours. diazepam 2 MG Oral Tab Take 3. Level of pain: no actual pain as before however experiencing many GI sx's  Non-verbal signs of pain present: NO    Physical Exam:  General: Alert, awake and in no apparent respiratory distress. Downcast due to above sx's  HEENT: No focal deficits.    Neurol prescriptions ready in medical chart whenever she is clinically stable for d/c to home    Follow up in my OP clinic at the [facility] where I will continue to follow up with sx's management and gingerly revisit Jose Rodriguez and ACP.     I will continue to follow clinically

## 2018-05-29 NOTE — PROGRESS NOTES
[facility] Cardiology/[facility]  Progress Note  ASSESSMENT:    1.       P Atrial fibrillation with rapid ventricular response.   2.       Metastatic melanoma with new brain lesions.     PLAN:    -        Beta blockers for rate control and calcium channel 5/23/18  -------------------------------------------------------------------  Study Conclusions  1. Left ventricle: The cavity size was normal. Systolic function     was normal. The estimated ejection fraction was 55-60%.  Wall     motion was normal; there Sodium (LOVENOX) 40 MG/0.4ML injection 40 mg 40 mg Subcutaneous Daily   valGANciclovir HCl (VALCYTE) tab TABS 900 mg 900 mg Oral BID   diphenhydrAMINE (BENADRYL) cap/tab 25 mg 25 mg Oral Q8H PRN   nystatin (MYCOSTATIN) suspension 500,000 Units 5 mL Oral QI

## 2018-05-29 NOTE — PROGRESS NOTES
[facility] Hospitalist Progress Note     CC: Hospital Follow up    PCP: Xin Greer MD       Assessment/Plan:     Principal Problem:    Brain metastases [facility])  Active Problems:    Metastatic melanoma ([facility] [address]    Hyperammonemia (HCC)    Transaminitis    Hyperkalemia chemotherapy  -rates overall improved around 110-120s with intermittent HR in 150s    New brain mets  - Onc/NS consulted  - started on Decadron, will continue  - consider MRI brain when electrolytes stable, could be done as outpatient per onc  - will likel [address] [place] [address], agree with plan.   Pt is tolerating  - added prn xanax    Hypocalcemia  - initially thought to be due to possible tumor lysis  - other tumor lysis labs improving however calcium remains low  - ontinue oral repletion  -  could be related to [place] patient's family given opportunity to ask questions and note understanding and agreeing with therapeutic plan as outlined     Nolvia Moser DO  [facility] Hospitalist  Answering service: [phone]       Subjective:      Worsening lower abd pain, cramping, diarrhe PLT  484*  452*  396         Recent Labs   Lab  05/27/18   0615  05/28/18   0511  05/29/18   0409   GLU  229*  253*  231*   BUN  14  14  16   CREATSERUM  0.36*  0.44*  0.38*   GFRAA  >60  >60  >60   GFRNAA  >60  >60  >60   CA  7.2*  7.7*  7.3*   NA  138

## 2018-05-29 NOTE — CM/SW NOTE
Progression of care - Diarrhea, C. Diff neg. On PO vanco, refusing Augmentin. Will return home w/ spouse when medically stable. Palliative following.   Thais Montemayor RN, CTL

## 2018-05-29 NOTE — PROGRESS NOTES
[facility] AND [facility] Infectious Disease  Progress Note    José Luis Dong Patient Status:  Inpatient    1980 MRN [id number]   Location [facility] 3W/SW Attending Reza Oliver, [facility] Day # 25 PCP Wynell Mortimer, MD     Subjective:  Patient s without edema. 3. Small less than 5 mm cortical nodule left frontal lobe, suspect metastasis. Torres August   4. No significant ventricular effacement, intracranial hemorrhage or midline shift.     Antibiotics Reviewed:  Zosyn     Assessment and Plan:  1.  Profound WBC

## 2018-05-30 NOTE — PROGRESS NOTES
[facility]  Progress Note    José Luis Dong Patient Status:  Inpatient    1980 MRN [id number]   Location [facility] 4W/SW/SE Attending Lisa Rhoades MD   [facility] Day # 23 PCP Wynell Mortimer, MD     Subjective:  Now with diarrhea    Minh Hoover board  - holding A/C in the setting of new and untreated brain mets   - metoprolol TID as there is an interaction between cardizem and chemotherapy     Anemia   - hgb of 9.2  - workup negative  - secondary to anemia of chronic disease    Electrolyte imbala

## 2018-05-30 NOTE — CM/SW NOTE
5/30/18 CM Discharge planning / MDO resume [facility]   Left message for Troy Buckle intake at [address] [facility], advised pt will be discharged home later today, with pleurex cath. Resume [facility] orders faxed to [facility]  via [email or web address].   RN will send 4 kits

## 2018-05-30 NOTE — PLAN OF CARE
Problem: Patient Centered Care  Goal: Patient preferences are identified and integrated in the patient's plan of care  Interventions:  - What would you like us to know as we care for you? I want to do everything possible to get better.   - Provide timely, c FALL  Goal: Free from fall injury  INTERVENTIONS:  - Assess pt frequently for physical needs  - Identify cognitive and physical deficits and behaviors that affect risk of falls.   - [place] fall precautions as indicated by assessment.  - Educate pt/family Progressing    Goal: Maintains or returns to baseline bowel function  INTERVENTIONS:  - Assess bowel function  - Maintain adequate hydration with IV or PO as ordered and tolerated  - Evaluate effectiveness of GI medications  - Encourage mobilization and ac participate in ADLs to maximize function  - Promote sitting position while performing ADLs such as feeding, grooming, and bathing  - Educate and encourage patient/family in tolerated functional activity level and precautions during self-care     Outcome: P Monitor for signs/symptoms of CO2 retention   Outcome: Progressing

## 2018-05-30 NOTE — DIABETES ED
[facility] [facility] - [facility]    Diabetes Education  Note    Cyndi Mahmood Patient Status:  Inpatient   1980 MRN [id number]  Location [facility] 3W/SW Attending Liban Case Day # 23 PCP Maddison Grier MD    Order received to see pt wit

## 2018-05-30 NOTE — CONSULTS
[facility]  Palliative Care Follow Up    Jess Palmer Patient Status:  Inpatient    1980 MRN [id number]   [facility] 3W/SW Attending Alisson Vu, DO   Hosp Day # 23 PCP Weston Andrews MD     Date of Consult:  ondansetron (ZOFRAN) tab, 8 mg, Oral, Q8H PRN  •  diazepam (VALIUM) tab 5 mg, 5 mg, Oral, Q6H PRN  •  diazepam (VALIUM) tab 7 mg, 7 mg, Oral, BID  •  mirtazapine (REMERON) tab 30 mg, 30 mg, Oral, Nightly  •  Morphine Sulfate (Concentrate) concentrated solu Check your blood sugar in the morning before taking this medication. Do not take this medication if your blood sugar is less than 120   Metoprolol Succinate  MG Oral Tablet 24 Hr Take 1 tablet (100 mg total) by mouth daily.    nystatin 269039 UNIT/ML 05/06/2018   MG 1.9 05/30/2018   PHOS 2.3 (L) 05/30/2018   TROP 0.01 06/09/2017       Imaging:        Objective:  Vital Signs:  Blood pressure 100/68, pulse 91, temperature 97.9 °F (36.6 °C), temperature source Oral, resp.  rate 18, weight 135 lb 9.6 oz (61 examination, and >50% was spent counseling and coordinating care. FULL CODE STATUS    Continue current opioid regimens    Continue Zofran ATC for 24 hours due to severe nausea - we discussed medication interactions and benefits/burdens of Zofran.  She wi

## 2018-05-30 NOTE — PROGRESS NOTES
[facility] AND [facility] Infectious Disease  Progress Note    Keshawn Trinidad Patient Status:  Inpatient    1980 MRN [id number]   Location [facility] 3W/SW Attending Faraz Vivas DO   Hosp Day # 23 PCP Karon Dutta MD     Subjective:  Patient s ventricular effacement, intracranial hemorrhage or midline shift.     Antibiotics Reviewed:  Valcyte     Assessment and Plan:  1.  Profound WBC elevation from steroids and metastatic disease  - Biliary issues also possibly contributing due to widespread me

## 2018-05-30 NOTE — PROGRESS NOTES
[facility] Cardiology/[facility]  Progress Note  ASSESSMENT:    1.       P Atrial fibrillation with rapid ventricular response.   2.       Metastatic melanoma with new brain lesions.     PLAN:    -        Beta blockers for rate control and calcium channel Alert and oriented, normal affect. Skin: Warm and dry. Psych: Appropriate      Laboratory/Data:  Diagnostics:   EKG:   Echo: 5/23/18  -------------------------------------------------------------------  Study Conclusions  1. Left ventricle:  The cavity s Nightly   Morphine Sulfate (Concentrate) concentrated solution 15 mg 15 mg Oral Q2H PRN   morphINE sulfate (PF) 4 MG/ML injection 2 mg 2 mg Intravenous Q2H PRN   Vancomycin HCl (FIRVANQ) 50 MG/ML oral solution 125 mg 125 mg Oral Daily   Normal Saline Flush

## 2018-05-31 NOTE — CM/SW NOTE
RN informed KATHARINE that pt is medically cleared to discharge today. [address] [address] orders have been entered and sent to [facility] via [employer]. KATHARINE left a voicemail for [facility].      [address] [facility] [phone]    [facility] [address]

## 2018-05-31 NOTE — DISCHARGE SUMMARY
General Medicine Discharge Summary     Patient ID:  Marcia Nurse  41year old  5/22/1980    Admit date: 5/11/2018    Discharge date and time: 5/31/18    Attending Physician: Stacia Myers MD     Consults: IP CONSULT TO HOSPITALIST  IP CONSULT TO ONCOLOGY  I Yoan(radiation), and Dr. Beni Pettitace care)    Exam  Gen: No acute distress  Pulm: Lungs clear, normal respiratory effort  CV: Heart with regular rate and rhythm  Abd: Abdomen soft, distended      HPI:   Per Dr. Rayshawn Li:  History of Present Illne on 5/23/18- improved, Sinus Tachy at time of d/c  -seen by cards, HR up to 170's at onset, new afib  -echo ordered with EF of 55-60%, no LV dysfunction  -no AC given brain mets  - Initially started on diltiazem, however, per pharmacy drug interaction with per week, currently draining daily due to high amounts of ascites and increased SOB  - hold lasix  - limit pleurex drainage to 2.5-3L per day, replace albumin PRN     Abdominal pain  - improved     Diarrhea and worsening lower abd pain- improved  -see init palliative after dc  - consulted palliative care today for symptom control, may eventually start [address] conversations but patient and family not ready at this point  - cont cotellic        [address]  - patient hospice appropriate but pt is not ready   - FULL code s insulin detemir 100 UNIT/ML Sopn  Commonly known as:  LEVEMIR  Inject 10 Units into the skin daily. Check your blood sugar in the morning before taking this medication.  Do not take this medication if your blood sugar is less than 120     Insulin Pen Need to Get Your Medications      These medications were sent to [facility] [address] [place] [address], [address] [place] [address], [phone], [address] [place] [address], [address] [place] [id number]    Phone:  [phone] HOSPITALIST  Why:  follow-up on your pain medications/pain control  Contact information:  [address] [facility]  [phone]                   DC instructions:       Other Discharge Instructions:         DIETITIAN INSTRUCTIONS: USE 2-3 N mg total) by mouth daily. dexamethasone 4 MG tablet  Commonly known as:  DECADRON  Take 1 tablet (4 mg total) by mouth 3 (three) times daily with meals.      * diazepam 2 MG Tabs  Commonly known as:  VALIUM  Take 3.5 tablets (7 mg total) by mouth 2 (two stocking knee high- 30 mm Quantity: 2     Ondansetron HCl 4 mg tablet  Commonly known as:  ZOFRAN  Take 1 tablet (4 mg total) by mouth every 8 (eight) hours as needed.         STOP taking these medications    furosemide 20 MG Tabs  Commonly known as:  LASIX

## 2018-05-31 NOTE — PROGRESS NOTES
Shanda Del Valle is a 41year old female. Patient presents with:  Dyspnea ABIOLA SOB (respiratory)      HPI:    Nausea better and sleep better    REVIEW OF SYSTEMS:   A comprehensive 11 point review of systems was completed.   Pertinent positives and negatives mouth daily. Disp: 30 tablet Rfl: 1   vemurafenib 240 MG Oral Tab tablet Take 4 tablets (960 mg total) by mouth 2 (two) times daily.  Disp: 60 tablet Rfl: 0   Morphine Sulfate, Concentrate, 100 MG/5ML Oral Solution Take 0.8 mL (16 mg total) by mouth every 2 non-septic and in NAD. HEENT:  Normocephalic, Nonicteric,Conjunctiva pale  Oropharynx clear, trachea ML. NECK:  Supple, no masses, no lymphadenopathy. LUNGS:  Clear to auscultation b/l, no rhonchi, rales, or wheezes.   CARDIO: RRR Z7/V2, no rubs, clicks, mg/dL   -BASIC METABOLIC PANEL (8)   Result Value Ref Range   Glucose 130 (H) 70 - 99 mg/dL   Sodium 124 (L) 136 - 144 mmol/L   Potassium 5.6 (H) 3.3 - 5.1 mmol/L   Chloride 91 (L) 95 - 110 mmol/L   CO2 17 (L) 22 - 32 mmol/L   BUN 29 (H) 8 - 20 mg/dL   Cre (G-CSF, corticosteroids, epinephrine, lithium, toxins/poisons/venoms), tissue necrosis (infarct, trauma, burns, acute gout), inflammatory disorders (collagen vascular/autoimmune), acute hemorrhage/hemolysis, and myeloproliferative disorders.  Clinical corre g/dL   Albumin 1.2 (L) 3.5 - 4.8 g/dL   Globulin 3.7 2.5 - 3.7 g/dL   A/G Ratio 0.3 (L) 1.0 - 2.0   Anion Gap 14 0 - 18 mmol/L   BUN/CREA Ratio 32.9 (H) 10.0 - 20.0   Calculated Osmolality 263 (L) 275 - 295 mOsm/kg   GFR, Non-African American >60 >=60   GF Procalcitonin 2.49 (H) <=0.11 ng/mL   -REDRAW POTASSIUM (P)   Result Value Ref Range   Potassium 5.9 (H) 3.3 - 5.1 mmol/L   -BASIC METABOLIC PANEL (8)   Result Value Ref Range   Glucose 138 (H) 70 - 99 mg/dL   Sodium 125 (L) 136 - 144 mmol/L   Potassium (H) 32 - 100 U/L   Bilirubin, Total 3.0 (H) 0.3 - 1.2 mg/dL   Total Protein 4.9 (L) 5.9 - 8.4 g/dL   Albumin 1.2 (L) 3.5 - 4.8 g/dL   Globulin 3.7 2.5 - 3.7 g/dL   A/G Ratio 0.3 (L) 1.0 - 2.0   Anion Gap 11 0 - 18 mmol/L   BUN/CREA Ratio 41.1 (H) 10.0 - 20 99 mg/dL   Sodium 127 (L) 136 - 144 mmol/L   Potassium 5.0 3.3 - 5.1 mmol/L   Chloride 94 (L) 95 - 110 mmol/L   CO2 20 (L) 22 - 32 mmol/L   BUN 27 (H) 8 - 20 mg/dL   Creatinine 0.63 0.50 - 1.50 mg/dL   Calcium, Total 5.8 (LL) 8.5 - 10.5 mg/dL   ALT 48 14 - -CBC, PLATELET; NO DIFFERENTIAL   Result Value Ref Range   WBC 45.7 (H) 4.0 - 11.0 K/UL   RBC 3.87 3.70 - 5.40 M/UL   HGB 8.4 (L) 12.0 - 16.0 g/dL   HCT 28.7 (L) 35.0 - 48.0 %   MCV 74.1 (L) 80.0 - 100.0 fL   MCH 21.7 (L) 27.0 - 32.0 pg   MCHC 29.2 (L) 3 Alkaline Phosphatase 1,620 (H) 32 - 100 U/L   Bilirubin, Total 2.1 (H) 0.3 - 1.2 mg/dL   Total Protein 4.8 (L) 5.9 - 8.4 g/dL   Albumin 1.7 (L) 3.5 - 4.8 g/dL   Globulin 3.1 2.5 - 3.7 g/dL   A/G Ratio 0.5 (L) 1.0 - 2.0   Anion Gap 9 0 - 18 mmol/L   BUN/C Detection, PCR Not Detected    -RENAL FUNCTION PANEL   Result Value Ref Range   Glucose 234 (H) 70 - 99 mg/dL   Sodium 133 (L) 136 - 144 mmol/L   Potassium 4.0 3.3 - 5.1 mmol/L   Chloride 100 95 - 110 mmol/L   CO2 23 22 - 32 mmol/L   BUN 14 8 - 20 mg/dL 0.42 (L) 0.50 - 1.50 mg/dL   Calcium, Total 6.8 (L) 8.5 - 10.5 mg/dL   BUN/CREA Ratio 31.0 (H) 10.0 - 20.0   Anion Gap 8 0 - 18 mmol/L   Calculated Osmolality 289 275 - 295 mOsm/kg   GFR, Non-African American >60 >=60   GFR, -American >60 >=60   -MA mg/dL   Sodium 134 (L) 136 - 144 mmol/L   Potassium 4.1 3.3 - 5.1 mmol/L   Chloride 99 95 - 110 mmol/L   CO2 27 22 - 32 mmol/L   BUN 13 8 - 20 mg/dL   Creatinine 0.41 (L) 0.50 - 1.50 mg/dL   Calcium, Total 7.3 (L) 8.5 - 10.5 mg/dL   BUN/CREA Ratio 31.7 (H) Calcium, Total 7.2 (L) 8.5 - 10.5 mg/dL   Albumin 1.9 (L) 3.5 - 4.8 g/dL   Phosphorus 2.3 (L) 2.4 - 4.7 mg/dL   BUN/CREA Ratio 38.9 (H) 10.0 - 20.0   Anion Gap 4 0 - 18 mmol/L   Calculated Osmolality 294 275 - 295 mOsm/kg   GFR, Non-African American >60 -COMP METABOLIC PANEL (14)   Result Value Ref Range   Glucose 252 (H) 70 - 99 mg/dL   Sodium 133 (L) 136 - 144 mmol/L   Potassium 4.2 3.3 - 5.1 mmol/L   Chloride 99 95 - 110 mmol/L   CO2 27 22 - 32 mmol/L   BUN 15 8 - 20 mg/dL   Creatinine 0.38 (L) 0.50 Value Ref Range   POC Glucose  152 (H) 70 - 99   -POCT GLUCOSE   Result Value Ref Range   POC Glucose  337 (H) 70 - 99   -POCT GLUCOSE   Result Value Ref Range   POC Glucose  344 (H) 70 - 99   -POCT GLUCOSE   Result Value Ref Range   POC Glucose  203 (H) 7 Absolute 0.0 0.0 - 0.7 K/UL   Basophil Absolute 0.0 0.0 - 0.2 K/UL   Metamyelocyte Absolute 0.56 (H) 0 K/UL   Myelocyte Absolute Manual 0.56 (H) 0 K/UL   Promyelocytes # 0.56 (H) 0 K/UL   Blast Absolute 0.56 (H) 0 K/UL   -CBC W/ DIFFERENTIAL   Result Value RBC 4.02 3.70 - 5.40 M/UL   HGB 8.8 (L) 12.0 - 16.0 g/dL   HCT 29.5 (L) 35.0 - 48.0 %   MCV 73.4 (L) 80.0 - 100.0 fL   MCH 21.8 (L) 27.0 - 32.0 pg   MCHC 29.7 (L) 32.0 - 37.0 g/dl   RDW 24.3 (H) 11.0 - 15.0 %    140 - 400 K/UL   MPV 7.4 7.4 - 10. 3 140 - 400 K/UL   MPV 7.5 7.4 - 10.3 fL   Neutrophil % 91 %   Lymphocyte % 3 %   Monocyte % 1 %   Eosinophil % 0 %   Basophil % 0 %   Band % 3 0-10 % %   Myelocyte % 2 %   Neutrophil Absolute 25.1 (H) 1.8 - 7.7 K/UL   Lymphocyte Absolute 0.8 (L) 1.0 - 4.0 K Basophil Absolute 0.2 0.0 - 0.2 K/UL   -CBC W/ DIFFERENTIAL   Result Value Ref Range   WBC 27.9 (H) 4.0 - 11.0 K/UL   RBC 4.09 3.70 - 5.40 M/UL   HGB 9.0 (L) 12.0 - 16.0 g/dL   HCT 29.8 (L) 35.0 - 48.0 %   MCV 73.0 (L) 80.0 - 100.0 fL   MCH 22.1 (L) 27. 0 Absolute 0.1 0.0 - 0.2 K/UL   -CBC W/ DIFFERENTIAL   Result Value Ref Range   WBC 22.5 (H) 4.0 - 11.0 K/UL   RBC 3.85 3.70 - 5.40 M/UL   HGB 8.6 (L) 12.0 - 16.0 g/dL   HCT 28.8 (L) 35.0 - 48.0 %   MCV 74.8 (L) 80.0 - 100.0 fL   MCH 22.4 (L) 27.0 - 32.0 pg 0.0 - 0.2 K/UL   -CBC W/ DIFFERENTIAL   Result Value Ref Range   WBC 26.2 (H) 4.0 - 11.0 K/UL   RBC 4.12 3.70 - 5.40 M/UL   HGB 9.3 (L) 12.0 - 16.0 g/dL   HCT 31.4 (L) 35.0 - 48.0 %   MCV 76.2 (L) 80.0 - 100.0 fL   MCH 22.5 (L) 27.0 - 32.0 pg   MCHC 29.6 ( Absolute 0.4 0.0 - 1.0 K/UL   Eosinophil Absolute 0.0 0.0 - 0.7 K/UL   Basophil Absolute 0.0 0.0 - 0.2 K/UL   -CBC W/ DIFFERENTIAL   Result Value Ref Range   WBC 20.3 (H) 4.0 - 11.0 K/UL   RBC 4.29 3.70 - 5.40 M/UL   HGB 9.8 (L) 12.0 - 16.0 g/dL   HCT 32. 3

## 2018-05-31 NOTE — PROGRESS NOTES
RADIATION ONCOLOGY NOTE    Dear Daja Betancur and colleagues,    Per our conversation, Mrs Dollene Mohs has history of metastatic melanoma, with brain metastasis identified on CT scan.   I have reviewed the findings with  and pt and discussed the role of XRT f

## 2018-05-31 NOTE — PROGRESS NOTES
[facility]  Progress Note  ASSESSMENT:    1.       P Atrial fibrillation with rapid ventricular response.   2.       Metastatic melanoma with new brain lesions.     PLAN:    -        Beta blockers for rate control and calcium channel present L arm picc  Neurologic: Alert and oriented, normal affect. Skin: Warm and dry.    Psych: Appropriate      Laboratory/Data:  Diagnostics:   EKG:   Echo: 5/23/18  -------------------------------------------------------------------  Study Conclusions mg Oral Nightly   Morphine Sulfate (Concentrate) concentrated solution 15 mg 15 mg Oral Q2H PRN   morphINE sulfate (PF) 4 MG/ML injection 2 mg 2 mg Intravenous Q2H PRN   Vancomycin HCl (FIRVANQ) 50 MG/ML oral solution 125 mg 125 mg Oral Daily   Normal Aida Navy

## 2018-05-31 NOTE — PROGRESS NOTES
Pt c/o mild abdominal pain that is tolerable and nausea. PO zofran given with adequate relief. IV zofran q6h started. Discharge planning discussed with pt. If she feels better tomorrow, possible discharge home.  Pt's  demonstrated proper injection of

## 2018-05-31 NOTE — CONSULTS
[facility]  Palliative Care Follow Up    Erin Poe Patient Status:  Inpatient    1980 MRN [id number]   Location [facility] 3W/SW Attending Amanda Rothman MD   Hosp Day # 21 PCP Kristine Jeffers MD     Date of Consult: 2018 5 mg, Oral, Q6H PRN  •  diazepam (VALIUM) tab 7 mg, 7 mg, Oral, BID  •  mirtazapine (REMERON) tab 30 mg, 30 mg, Oral, Nightly  •  Morphine Sulfate (Concentrate) concentrated solution 15 mg, 15 mg, Oral, Q2H PRN  •  morphINE sulfate (PF) 4 MG/ML injection 2 daily. Check your blood sugar in the morning before taking this medication. Do not take this medication if your blood sugar is less than 120   Metoprolol Succinate  MG Oral Tablet 24 Hr Take 1 tablet (100 mg total) by mouth daily.    nystatin [id number] U 05/30/2018   TP 4.1 (L) 05/30/2018   AST 30 05/30/2018   ALT 58 (H) 05/30/2018   DDIMER >4.00 (H) 05/06/2018   MG 1.8 05/31/2018   PHOS 2.5 05/31/2018   TROP 0.01 06/09/2017           Objective:  Vital Signs:  Blood pressure 122/88, pulse 87, temperature 9 total of 15 minutes were spent on this consult, which included all of the following:direct face to face contact, history taking, physical examination, and >50% was spent counseling and coordinating care.     FULL CODE STATUS    HCPILDEFONSO is her     Presc

## 2018-05-31 NOTE — PROGRESS NOTES
[facility] Hospitalist Progress Note     CC: Hospital Follow up    PCP: Rudy Benz MD       Assessment/Plan:     Principal Problem:    Brain metastases [facility])  Active Problems:    Metastatic melanoma ([facility] [address]    Hyperammonemia (HCC)    Transaminitis    Hyperkalemia current prognosis is currently poor and limited other options given cardizem interaction with chemotherapy.   -d/c digoxin per cardiology, likely of limited benefit  -rates overall improved, back in sinus rhythm today    New brain mets  - Onc/NS consulted initial abd pain w/u above  -c.diff repeat test is negative  -loperamide prn  -better after augmentin d/c'ed.  Will monitor off abx given severity of side effects outweighing benefits     Anxiety  - tried ativan, states it just makes her loopy and sleepy, w cotellic        GOC  - patient hospice appropriate but pt is not ready   - FULL code status     FN:  - IVF: none  - Diet: general     DVT Prophy: SCD, on lovenox (has been on this since 5/16)  Lines: PIV     Dispo: pending hospital course     Further recom intact, sensory intact  Psych: Affect- normal  SKIN: warm, dry  EXT: 2+ edema BLE to hips    Data Review:       Labs:     Recent Labs   Lab  05/28/18   0512  05/29/18   0409  05/30/18   0356   RBC  4.12  4.14  4.08   HGB  9.3*  9.2*  9.2*   HCT  31.4*  31. 300 mg Oral Daily       Loperamide HCl, dextrose, Glucose-Vitamin C, glucose, metoprolol Tartrate, diazepam, Morphine Sulfate (Concentrate), morphINE sulfate (PF), Normal Saline Flush, guaiFENesin, Normal Saline Flush, diphenhydrAMINE, Normal Saline Flush,

## 2018-05-31 NOTE — PROGRESS NOTES
[facility]  Progress Note    Ayah Quiet Patient Status:  Inpatient    1980 MRN [id number]   Location [facility] 4W/SW/SE Attending Jody Mckeon MD   Hosp Day # 21 PCP Lori Mitchell MD     Subjective:  Plan for going home today board  - holding A/C in the setting of new and untreated brain mets   - metoprolol TID as there is an interaction between cardizem and chemotherapy     Anemia   - hgb of 9.8  - workup negative  - secondary to anemia of chronic disease    Electrolyte imbala

## 2018-06-13 NOTE — TELEPHONE ENCOUNTER
instructed to schedule MRI call to schedule at [phone] and ask to have scan either at the [facility] or [place] location. Pt has a [facility] MD.   understood. He will call me once he has a date on my direct line.

## 2018-06-16 NOTE — TELEPHONE ENCOUNTER
TELEPHONE NOTE    Discussed with pt's  RE findings of high resolution brain MRI, which revealed multiple mets (and not limited as seen in initial CT scan).       Therefore, agree with Dr. Camden Muller of [facility] Rad/Onc that whole brain XRT is indicated and can

## 2018-06-19 NOTE — CM/SW NOTE
KATHARINE received a call from Dr. Mili Nevarez regarding the pt. Needing community palliative care services. The pt. Is current with [facility], and KATHARINE spoke with [facility]. [facility] cannot accept the pt.  As she does not have a

## 2018-06-25 NOTE — PROGRESS NOTES
[facility]  Palliative Care Follow Up    Pippa Crow Patient Status:  Outpatient    1980 MRN [id number]   Location [address]  Attending Tamie Espinoza MD   Hosp Day # 0 PCP Zarina Coleman MD     Date of daily.Check blood sugar in the morning before taking medication. Do not take this medication if blood sugar is less than 120   insulin detemir 100 UNIT/ML Subcutaneous Solution Pen-injector Inject 16 units daily.  Check your blood sugar in the morning befor BUN 18 05/31/2018    05/31/2018   K 4.2 05/31/2018    05/31/2018   CO2 27 05/31/2018    (H) 05/31/2018   CA 7.8 (L) 05/31/2018   ALB 1.9 (L) 05/30/2018   ALKPHO 749 (H) 05/30/2018   BILT 1.0 05/30/2018   TP 4.1 (L) 05/30/2018   AST 30 was spent counseling and coordinating care.     FULL CODE STATUS    HCPOA is her     Refills of MS CR 15 mg po Q 8 ATC    Roxanol 15 mg po prn for breakthrough painn    Mirtazipine 30 mg po Q HS nightly    Referral to Integrative Medicine clinic for

## 2018-07-03 NOTE — PROGRESS NOTES
Reiki Intake and Documentation    Patient Name: Oj Lewis   YOB: 1980   Medical Record Number: [id number]   CSN: [id number]     Date of Visit: 7/3/2018    Reason for scheduling reiki session: No chief complaint on file.     Name of referr Prescriptions:  LEVEMIR FLEXTOUCH 100 UNIT/ML Subcutaneous Solution Pen-injector INJECT 20 UNITS SUBCUTANEOUS EVERY DAY. CHECK BLOOD SUGARS IN THE MORING BEFORE TAKING.  DO NOT TAKE THIS IF BLOOD SUGAR IS LESS THAN 120 Disp: 3 mL Rfl: 0   Ondansetron HCl (Z 500 MG Oral Chew Tab Chew 1 tablet (500 mg total) by mouth 3 (three) times daily. Disp: 90 tablet Rfl: 1   calciTRIOL 0.25 MCG Oral Cap Take 1 capsule (0.25 mcg total) by mouth daily.  Disp: 30 capsule Rfl: 0   cobimetinib 20 MG Oral Tab tablet Take 3 table

## 2018-07-06 NOTE — PROGRESS NOTES
LOWELL George.          Patient reports to be more calm and more settle by 45%. Initial Intake           Patient came today reporting pain in the back and with a lot of inflammation all over the body.  Patient is in a lot of stress due to the c

## 2018-07-06 NOTE — PROGRESS NOTES
Shawna Osgood, L. Ac.           Initial Intake           Patient came today reporting pain in the back and with a lot of inflammation all over the body. Patient is in a lot of stress due to the condition of her health.        Objective:   Tongue: lack

## 2018-07-13 PROBLEM — G89.3 CANCER RELATED PAIN: Status: ACTIVE | Noted: 2018-01-01

## 2018-07-13 PROBLEM — Z71.89 GOALS OF CARE, COUNSELING/DISCUSSION: Status: ACTIVE | Noted: 2017-04-20

## 2018-07-13 NOTE — CONSULTS
Hematology/Oncology Consult Note        NAME: Josh Velazquez - ROOM: 220/220-A - MRN: [id number] - Age: 41year old - : 1980    Reason for Consult:  Metastatic Melanoma     Patient is a 41 y.o female well known to oncology , primary oncologist is MULU Comment: socially     Medications:  • docusate sodium  100 mg Oral BID   • sodium chloride       • enoxaparin  40 mg Subcutaneous Daily   • mirtazapine  30 mg Oral Nightly   • Insulin Aspart Pen  1-5 Units Subcutaneous TID CC   • diazepam  5 mg Oral 2 time HCT  35.5   MCV  78.2*   MCH  24.1*   MCHC  30.8*   RDW  23.9*   WBC  14.0*   PLT  149     Recent Labs   Lab  07/12/18   1925  07/13/18   0508   GLU  149*  105*   BUN  11  12   CREATSERUM  0.57  0.39*   GFRAA  >60  >60   GFRNAA  >60  >60   CA  6.2*  5.9*

## 2018-07-13 NOTE — PROGRESS NOTES
Patient transferred to CCU this am due to hypotension and lethargy. Patient had been admitted to ED with ammonia level of 171. Lactulose enema given on 4th floor for ammonia level, but patient was unable to retain it.   Patient's BP was 93/75 and 86/76 upon

## 2018-07-13 NOTE — DIETARY NOTE
ADULT NUTRITION INITIAL ASSESSMENT    Pt is at high nutrition risk. Pt meets malnutrition criteria.       RECOMMENDATIONS TO MD:  See Nutrition Intervention     CRITERIA FOR MALNUTRITION DIAGNOSIS:  Criteria for severe malnutrition diagnosis: chronic illne 6.4 oz)--true wt masked by fluids. Est dry wt 115# (used this for calculations). BMI: Body mass index is 24.52 kg/m².          BMI Classification: 19-24.9 kg/m2 - WNL  IBW: 115#         100% IBW       Usual Body Wt: 144#        80% UBW  WEIGHT HISTORY:  W Marianna Lau, [address], [address] [place] [address]   Clinical Dietitian  [phone]

## 2018-07-13 NOTE — CONSULTS
REFERRING PHYSICIAN: Dr. Pascal ref. provider found    HPI:         Thank you very much for requesting me to see the patient.     As you know, Greer Nissen is a 41year old female with h/o metastatic melanoma (metastasis to nica, lung, bone; has malignant as Smokeless tobacco: Never Used                      Alcohol use:  No               Comment: socially          Current Facility-Administered Medications:  Normal Saline Flush 0.9 % injection 3 mL 3 mL Intravenous PRN   acetaminophen (TYLENOL) tab 650 mg 6 summary this is a 41year old F extensive metastatic melanoma (CNS, chest, liver, bone & peritoneum) who presented with mental status changes/lethargy. Recent CNS Rtx/completion of steroids. Significant metastatic burden in liver.      PLAN: 1.) lactulose 2

## 2018-07-13 NOTE — CONSULTS
[address] Patient Status:  Inpatient    1980 MRN [id number]   Location [facility] 2W/SW Attending Jessie Mcintosh MD   Hosp Day # 0 PCP Micheal Jordan MD     Date of Consult: she became more weak. We discussed the need to adjust these medications given her acute clinical condition. I know Shira Valentine from her previous hospital admission and also from an OP visit in my clinic. She knows about palliative care services.  At one point Comment:Sneezing, itchy throat, congestion    Medications:     Current Facility-Administered Medications:   •  Normal Saline Flush 0.9 % injection 3 mL, 3 mL, Intravenous, PRN  •  acetaminophen (TYLENOL) tab 650 mg, 650 mg, Oral, Q6H PRN  •  PEG 3350 (  07/13/2018       Coags:  Lab Results  Component Value Date   INR 1.5 (H) 07/13/2018   PTT 40.2 (H) 05/11/2018       Chemistry:  Lab Results  Component Value Date   CREATSERUM 0.39 (L) 07/13/2018   BUN 12 07/13/2018    (L) 07/13/2018   K 3.8 depressed. HEENT: No focal deficits. Cushingoid facial features.     Palliative Performance Scale : 30%    Palliative Care Goals of Care:  Discussed with Patient  and Family : Yes  Patient's preference about sharing medical information: okay to share with

## 2018-07-13 NOTE — H&P
[facility] Hospitalist Team  History and Physical     ASSESSMENT / PLAN:   40 yo female with PAF, hypocalcemia, anxiety, and metastatic melanoma to bone, lung,brain and lymph node S/P pleurx, immunotherapy with recent whole brain radiation who presents with [facility] onc     Anxiety/Dpression  -will continue  valium 5mg q 12 as was taking scheduled at home- defer to Dr Aileen Rosenthal  -continue remeron     GO  -full code-confirmed with  at [facility]  -[facility] palliative care as outpt- on MS contin changed to prn-was on 15 normocephalic; normal nose, pharynx and TM's; PERRLA, EOMI, sclera anicteric, conjunctiva normal  NECK: supple; no JVD; no carotid bruits   RESPIRATORY: normal expansion; non labored; CTA, right sided pleurx  CARDIOVASCULAR: regular,nl S1 S2; no murmur, no 3 mL Rfl: 0   mirtazapine 15 MG Oral Tab Take 2 tablets (30 mg total) by mouth nightly. Disp: 30 tablet Rfl: 0   morphINE Sulfate ER 15 MG Oral Tab CR Take 1 tablet (15 mg total) by mouth every 8 (eight) hours.  Disp: 90 tablet Rfl: 0   insulin detemir 100 the the \[facility]\"".       DIAGNOSTIC DATA:   CBC/Chem  Recent Labs   Lab  07/12/18 1925 07/13/18   0508   WBC  13.7*  14.0*   HGB  11.6*  10.9*   MCV  79.1*  78.2*   PLT  172  149   INR   --   1.5*       Recent Labs   Lab  07/12/18 1925 07/13/18   0508   NA abnormalities, and fevers with possible infection. She returns today and history obtained with help from her  noted with one week of weakness, lethargy/ inc sleepiness, poor PO intake.  No noted focal sx included no abd (or other pain), no loose sto

## 2018-07-13 NOTE — ED INITIAL ASSESSMENT (HPI)
Finished radiation last week. Per  pt has been lethargic and had a decreased appetite. tmax 100 at home.  Denies vomiting. +cough

## 2018-07-13 NOTE — ED NOTES
IVF infusing. Pt placed on 2L O2 for decreased SpO2 90-91%. Pts  reports her baseline is 95% on RA. Oxygen increased to 96% with 2L O2 via NC. Dr. Blanca Cha made aware of this. Patients feet elevated off the bed to decrease pressure on heels.   rep

## 2018-07-13 NOTE — PLAN OF CARE
CARDIOVASCULAR - ADULT    • Maintains optimal cardiac output and hemodynamic stability Progressing    • Absence of cardiac arrhythmias or at baseline Progressing        DISCHARGE PLANNING    • Discharge to home or other facility with appropriate resources on 2L of oxygen; saturating well. Patient remains in sinus tach. Patient is being turned q2; wound care came to see patient and aloe vesta is also being applied to small skin tear on patient's bottom.  Urine cultures, pluerx cath drainage was sent for cultu

## 2018-07-13 NOTE — ED PROVIDER NOTES
Patient Seen in: [facility] Emergency Department    History   Patient presents with:  Weakness    Stated Complaint: instructed to come in by home health for dehydration    HPI    41year old female with metastatic melanoma to bone, lung, lymph nod Constitutional and vital signs reviewed. All other systems reviewed and negative except as noted above.     Physical Exam   ED Triage Vitals [07/12/18 1910]  BP: 102/82  Pulse: (!) 132  Resp: 20  Temp: 98.6 °F (37 °C)  Temp src: Oral  SpO2: 91 %  O2 De BASIC METABOLIC PANEL (8) - Abnormal; Notable for the following:        Result Value    Glucose 149 (*)     Sodium 134 (*)     Calcium, Total 6.2 (*)     All other components within normal limits   AMMONIA, PLASMA - Abnormal; Notable for the following: Radiology findings: Xr Chest Ap Portable  (cpt=71045)    Result Date: 7/12/2018  CONCLUSION:   Innumerable pulmonary metastatic lesions, most confluent at the lung bases. No change to apparent progression since 5/2018 by radiography.   Dictated by (CST): Shoaib Woodard ICD-10-CM Noted POA    Hyperammonemia ([address] E72.20 5/11/2018 Unknown

## 2018-07-13 NOTE — ED NOTES
Right arm swelling noted. Dr. Oneyda Manuel verbal order for US of right arm DVT r/o. Order placed. Patient/ updated on this. US ready at this time.

## 2018-07-13 NOTE — PLAN OF CARE
Updated Dr. Vaishnavi Juarez regarding patient's HR and increasing pain. Due to patient's low BP only ativan oral will be given. Will continue to monitor patient.

## 2018-07-14 NOTE — PROGRESS NOTES
[facility] AND [facility]  Progress Note    [facility] [place] [facility]:  [facility]    1980 MRN [id number]   Location [facility] 4W/SW/SE Attending Dinora Calixto MD   Hosp Day # 1 PCP Shanda Chiu MD     Subjective:    Comfortable     Objective:  Blo radiation  - most recently on cobimetinib/vemurafenib  - appreciate palliative care on board for support, goals of care and pain management     She is on comfort care  We will sign off      Thank you for allowing me to participate in the care of this patie

## 2018-07-14 NOTE — PROGRESS NOTES
[facility] Dosing Service  Antibiotic Dosing    Niru Torrez is a 41year old female for whom pharmacy is dosing Zosyn for treatment of sepsis, unknown source. Pharmacy to dose consult requested by Dr Elizabeth Tierney.      Allergies: is allergic to other and se

## 2018-07-14 NOTE — PHYSICAL THERAPY NOTE
Chart reviewed; discussed pt with MARIA VICTORIA Panchal. Pt is not appropriate for physical therapy services. Pt currently under comfort care as an option to her worsening condition.  D/C PT

## 2018-07-14 NOTE — PLAN OF CARE
Patient received from emergency room. Patient minimally responsive, not opening eyes, but nodding head when name is called. Patient tachycardic and hypotensive at 95/78. Lactulose enema given but patient unable to retain.   IV fluids initiated and physic

## 2018-07-14 NOTE — H&P
[facility] Hospitalist Team      ASSESSMENT / PLAN:   42 yo female with PAF, hypocalcemia, anxiety, and metastatic melanoma to bone, lung,brain and lymph node S/P pleurx, immunotherapy with recent whole brain radiation who presents with lethargy/mental status antonietta CREATSERUM  0.57  0.39*   GLU  149*  105*   CA  6.2*  5.9*       Recent Labs   Lab  07/12/18   1925  07/13/18   0508   ALT  103*  96*   AST  108*  114*   ALB  1.4*  1.2*       No results for input(s): TROP in the last 168 hours.       Radiology: Us Venous

## 2018-07-14 NOTE — OCCUPATIONAL THERAPY NOTE
Orders received and chart reviewed. Per nurse, Gómez Guillory pt not appropriate for OT/PT evaluation this date. Will continue follow-ups with nursing.

## 2018-07-14 NOTE — PROGRESS NOTES
HR elevated upon start of shift at 7pm. Initially BP low 100s. RR starting to increase and work of breathing becoming more difficult. Oracio Parents to bedside, spoke with  who now wishes to make wife comfortable. Will continue to monitor.

## 2018-07-14 NOTE — SIGNIFICANT EVENT
ICU nocturnalist    Called by RN to evaluate patient in room 220. Patient currently obtunded, unable to express herself.  at bedside.   As per RN patient now tachycardic in the 140s, dyspneic with respiratory rate in the 40s saturating 92% on 6 L

## 2018-07-15 NOTE — PLAN OF CARE
Problem: Patient Centered Care  Goal: Patient preferences are identified and integrated in the patient's plan of care  Interventions:  - What would you like us to know as we care for you?  Please keep my  involved in my care  - Provide timely, comple Marie to gravity. Provided CDs & Music for Comfort care if needed. Frequent rounding.

## 2018-07-15 NOTE — PLAN OF CARE
CARDIOVASCULAR - ADULT    • Maintains optimal cardiac output and hemodynamic stability Progressing    • Absence of cardiac arrhythmias or at baseline Progressing        COPING    • Pt/Family able to verbalize concerns and demonstrate effective coping strat titrated for patients comfort. Ativan offered as prn for patient, family declined and wanted to hold off. Lots of family at friends at bedside. . Ice packs placed for noted fever. Kept patient comfortable.

## 2018-07-15 NOTE — H&P
[facility] Hospitalist Team      ASSESSMENT / PLAN:   40 yo female with PAF, hypocalcemia, anxiety, and metastatic melanoma to bone, lung,brain and lymph node S/P pleurx, immunotherapy with recent whole brain radiation who presents with lethargy/mental status antonietta Camilla (cpt=93971)    Result Date: 7/13/2018  CONCLUSION:  1. Negative for sonographic evidence of deep venous thrombosis in the right upper extremity. 2. The right cephalic vein is not visualized.     A preliminary report was issued by the [address] [place] [address] Radiology t

## 2018-07-15 NOTE — PROGRESS NOTES
GI  PROGRESS NOTE    SUBJECTIVE: events noted.  at bedside. OBJECTIVE:  Temp:  [98.2 °F (36.8 °C)-99.7 °F (37.6 °C)] 98.2 °F (36.8 °C)  Pulse:  [124-146] 126  Resp:  [22-37] 22  BP: ()/(22-86) 94/75  Exam  Gen: sleeping.    Exam deferre

## 2018-07-16 NOTE — PLAN OF CARE
Pt  at [address]. Resusitation not attempted as pt was DNR.     Time of Death:   Family at Bedside,  Leydi ISAACS: Notified Dr. Nancy Velarde of [address] of [facility] Coordinator: [place] #[phone]     contacted if applicable:

## 2018-07-18 NOTE — DISCHARGE SUMMARY
[facility] Internal Medicine Discharge Summary   Patient ID:  Jaydon Durant  [id number]  89 year old  5/22/1980    Admit date: 7/12/2018    Time of death: 7/15/2018 at 21:10    Attending Physician: Dr. Varma Mayaguez    Primary Care Physician: Judi Roberts MD     Admit Dx:

## 2020-02-24 NOTE — PROGRESS NOTES
[facility] AND [facility]  Progress Note    Verlene Dubin Patient Status:  Inpatient    1980 MRN [id number]   Location [facility] [place] [facility] 2W/ Attending Marivel Decker MD   Hosp Day # 2 PCP Tracey Tamez MD     Subjective:  Verlene Dubin is a(n) 40 ye in hospital.  Discussed with pt/, will need to plan treatment outpatient when her medications are available and when brain mets addressed.     2. Brain metastasis  Appreciate input from Neurosurgery  No neurosurgical intervention recommended.   She i No

## 2023-10-28 NOTE — CM/SW NOTE
Pt discussed during dc rounds. RN states pt is from home with  and children. Pt receiving oncology care with [facility]. Palliative care has been consulted. SW will assist with referrals as indicated by their recommendations.  SW also received [facility] orders, b 36.4

## 2024-05-13 NOTE — PROGRESS NOTES
[facility] AND [facility] Infectious Disease  Progress Note    Jess Palmer Patient Status:  Inpatient    1980 MRN [id number]   Location [facility] 4W/SW/SE Attending Raf Arce MD   Hosp Day # 5 PCP Weston Andrews MD     Subjective:  Chance Part shift.     Antibiotics Reviewed:  Zosyn     Assessment and Plan:  1.   Profound WBC elevation from steroids and metastatic disease  - Biliary issues also possibly contributing due to widespread metastasis, ascites  - WBCs have been elevated for many months, None Droplet+Contact precautions

## (undated) NOTE — MR AVS SNAPSHOT
[address]  [address] [phone]               Thank you for choosing us for your health care visit with Aurelio Quinn PT.   We are glad to serve you and happy to provide you with this summary o

## (undated) NOTE — LETTER
[address] [facility], [facility] [address]     I agree to have a Peripherally Inserted Central Catheter (PICC) placed in my arm.      1. The PICC insertion procedure, care, maintenance, risks, benefits, and complications Statement of Physician: My signature below affirms that prior to the time of the PICC line insertion, I have explained to the patient and/or his/her legal representative, the risks and benefits involved in the proposed treatment and any reasonable alternat

## (undated) NOTE — ED AVS SNAPSHOT
[facility] Emergency Department    [address]     [place] [address]    Phone:  [phone]    Fax:  [phone]           L[place]laudia [place]   MRN: [id number]    Department:  [facility] Emergency Department   Date of Visit:  6/9/2 HYDROcodone-acetaminophen 5-325 MG Tabs   Quantity:  20 tablet   Commonly known as:  NORCO   Take 1-2 tablets by mouth every 4 (four) hours as needed for Pain.          CONTINUE taking these medications     Compression Bandages Misc   Quantity:  2 each   R primary care or specialist physician may see patients referred from the [facility] Emergency Department. Follow-up care is at the discretion of that Physician.   If you need additional assistance selecting a physician, you may call the Tasha Huynh [facility] [address] [employer] ([facility] 43) [address] [facility] [address] [address]  [place] [address]) [phone]                Additional Information       We are concerned for your overall well being:    - If you are a smoker or

## (undated) NOTE — LETTER
[facility], [place]  Authorization for Invasive Procedures  1.  I hereby authorize Dr. Olga Lau , my physician and whomever may be designated as the doctor's assistant, to perform the following operation and/or procedure:  pleure performed for the purposes of advancing medicine, science, and/or education, provided my identity is not revealed. If the procedure has been videotaped, the physician/surgeon will obtain the original videotape.  The hospital will not be responsible for stor My signature below affirms that prior to the time of the procedure, I have explained to the patient and/or her legal representative, the risks and benefits involved in the proposed treatment and any reasonable alternative to the proposed treatment.  I have

## (undated) NOTE — LETTER
[address], [place]  Authorization for Surgical Operation or Procedure  Date: ______________       Time: _______________  1.  I hereby authorize Vilma TURK , my physician and the assistant, to perform the following operation physician. 5. I consent to the photographing of the operations or procedures to be performed for the purposes of advancing medicine, science, and/or education, provided my identity is not revealed.  If the procedure has been videotaped, the physician/joi risks and benefits involved in the proposed treatment and any reasonable alternative to the proposed treatment. I have also explained the risks and benefits involved in the refusal of the proposed treatment and have answered the patient's questions.  If I h

## (undated) NOTE — MR AVS SNAPSHOT
[facility]  [address]  [facility], [address] [place] [address] [phone]                    After Visit Summary   3/3/2017    Lexus Verdin    MRN: [id number]           Visit Information        Provider Department Dept Phone    3/3/20 view more details from this visit by going to [email or web address]. If you've recently had a stay at the Hospital you can access your discharge instructions in [facility] by going to Visits < Admission Summaries.  If you've been to the Emergency Depar

## (undated) NOTE — ED AVS SNAPSHOT
José Luis Dong   MRN: [id number]    Department:  [facility] Emergency Department   Date of Visit:  2/28/2018           Disclosure     Insurance plans vary and the physician(s) referred by the ER may not be covered by your plan.  Please contact y CARE PHYSICIAN AT ONCE OR RETURN IMMEDIATELY TO THE EMERGENCY DEPARTMENT. If you have been prescribed any medication(s), please fill your prescription right away and begin taking the medication(s) as directed.   If you believe that any of the medications